# Patient Record
Sex: MALE | Race: WHITE | Employment: FULL TIME | URBAN - NONMETROPOLITAN AREA
[De-identification: names, ages, dates, MRNs, and addresses within clinical notes are randomized per-mention and may not be internally consistent; named-entity substitution may affect disease eponyms.]

---

## 2020-09-28 NOTE — PROGRESS NOTES
Left message on voicemail. Pt. will arrive at the ER entrance at 0600 on Wednesday, Sept. 30th. Pt.informed that they may have one visitor come with them. The visitor must be free of covid symptoms. Both of them must wear a mask upon entering the hospital.  If they do not have a mask, one will be given to them at the . The masks must be worn the whole time they are in the building. The visitor must stay in the assigned room in Same Day Surgery. The visitor may not go to the vending machines, cafeteria, or walk around in the hospital. Pt. Will be NPO after MN on Tuesday, including no gum, candy, or nicotine products. Pt. will take BP meds with a tiny sip of water the morning of the procedure. If the patient takes insulin, he will only take 1/2 of his dose Tuesday night. If the patient uses inhalers or cpap, they will bring them with them to the hospital.  Pt. will shower with soap and water and will not use any lotions, creams, ointments on their skin. Pt. will wear no jewelry or metal.   Spoke with Deborah at Dr. Wanda Connelly office. We do not have Covid test results back for the patient yet. Asked [facility] to forward results to me via fax at [phone]. Pt. Should have been self-quarantining since their Covid-19 testing. If pt. Should develop a cough, sore throat, fever, or any other unusual s/s that the physician should be made aware of before surgery, please contact Dr. Wanda Connelly office right away. For any further questions/concerns, please call Same Day Surgery at  [phone].

## 2020-09-29 ENCOUNTER — ANESTHESIA EVENT (OUTPATIENT)
Dept: OPERATING ROOM | Age: 55
End: 2020-09-29
Payer: COMMERCIAL

## 2020-09-30 ENCOUNTER — HOSPITAL ENCOUNTER (OUTPATIENT)
Age: 55
Setting detail: OUTPATIENT SURGERY
Discharge: HOME | End: 2020-09-30
Attending: PODIATRIST | Admitting: PODIATRIST
Payer: COMMERCIAL

## 2020-09-30 ENCOUNTER — ANESTHESIA (OUTPATIENT)
Dept: OPERATING ROOM | Age: 55
End: 2020-09-30
Payer: COMMERCIAL

## 2020-09-30 VITALS
OXYGEN SATURATION: 98 % | SYSTOLIC BLOOD PRESSURE: 108 MMHG | RESPIRATION RATE: 17 BRPM | DIASTOLIC BLOOD PRESSURE: 57 MMHG

## 2020-09-30 VITALS
TEMPERATURE: 96.9 F | HEART RATE: 48 BPM | DIASTOLIC BLOOD PRESSURE: 80 MMHG | HEIGHT: 76 IN | BODY MASS INDEX: 30.81 KG/M2 | RESPIRATION RATE: 14 BRPM | WEIGHT: 253 LBS | OXYGEN SATURATION: 96 % | SYSTOLIC BLOOD PRESSURE: 140 MMHG

## 2020-09-30 LAB
GLUCOSE BLD-MCNC: 138 MG/DL (ref 70–99)
SARS-COV-2, NAAT: NOT DETECTED
SOURCE: NORMAL

## 2020-09-30 PROCEDURE — 2709999900 HC NON-CHARGEABLE SUPPLY: Performed by: PODIATRIST

## 2020-09-30 PROCEDURE — 88311 DECALCIFY TISSUE: CPT

## 2020-09-30 PROCEDURE — 3700000000 HC ANESTHESIA ATTENDED CARE: Performed by: PODIATRIST

## 2020-09-30 PROCEDURE — 6360000002 HC RX W HCPCS: Performed by: NURSE ANESTHETIST, CERTIFIED REGISTERED

## 2020-09-30 PROCEDURE — 3600000012 HC SURGERY LEVEL 2 ADDTL 15MIN: Performed by: PODIATRIST

## 2020-09-30 PROCEDURE — 2500000003 HC RX 250 WO HCPCS: Performed by: NURSE ANESTHETIST, CERTIFIED REGISTERED

## 2020-09-30 PROCEDURE — 2500000003 HC RX 250 WO HCPCS: Performed by: PODIATRIST

## 2020-09-30 PROCEDURE — 2580000003 HC RX 258: Performed by: PODIATRIST

## 2020-09-30 PROCEDURE — 82962 GLUCOSE BLOOD TEST: CPT

## 2020-09-30 PROCEDURE — 88305 TISSUE EXAM BY PATHOLOGIST: CPT

## 2020-09-30 PROCEDURE — 6360000002 HC RX W HCPCS: Performed by: PODIATRIST

## 2020-09-30 PROCEDURE — C9803 HOPD COVID-19 SPEC COLLECT: HCPCS

## 2020-09-30 PROCEDURE — U0002 COVID-19 LAB TEST NON-CDC: HCPCS

## 2020-09-30 PROCEDURE — 7100000010 HC PHASE II RECOVERY - FIRST 15 MIN: Performed by: PODIATRIST

## 2020-09-30 PROCEDURE — 7100000011 HC PHASE II RECOVERY - ADDTL 15 MIN: Performed by: PODIATRIST

## 2020-09-30 PROCEDURE — 3700000001 HC ADD 15 MINUTES (ANESTHESIA): Performed by: PODIATRIST

## 2020-09-30 PROCEDURE — 3600000002 HC SURGERY LEVEL 2 BASE: Performed by: PODIATRIST

## 2020-09-30 RX ORDER — ATORVASTATIN CALCIUM 10 MG/1
40 TABLET, FILM COATED ORAL DAILY
COMMUNITY

## 2020-09-30 RX ORDER — FENTANYL CITRATE 50 UG/ML
INJECTION, SOLUTION INTRAMUSCULAR; INTRAVENOUS PRN
Status: DISCONTINUED | OUTPATIENT
Start: 2020-09-30 | End: 2020-09-30 | Stop reason: SDUPTHER

## 2020-09-30 RX ORDER — KETAMINE HYDROCHLORIDE 10 MG/ML
INJECTION, SOLUTION INTRAMUSCULAR; INTRAVENOUS PRN
Status: DISCONTINUED | OUTPATIENT
Start: 2020-09-30 | End: 2020-09-30 | Stop reason: SDUPTHER

## 2020-09-30 RX ORDER — SODIUM CHLORIDE 9 MG/ML
INJECTION, SOLUTION INTRAVENOUS CONTINUOUS
Status: DISCONTINUED | OUTPATIENT
Start: 2020-09-30 | End: 2020-09-30 | Stop reason: HOSPADM

## 2020-09-30 RX ORDER — MELOXICAM 15 MG/1
15 TABLET ORAL DAILY
Status: ON HOLD | COMMUNITY
End: 2020-12-03

## 2020-09-30 RX ORDER — LISINOPRIL 40 MG/1
40 TABLET ORAL DAILY
COMMUNITY

## 2020-09-30 RX ORDER — AMLODIPINE BESYLATE 10 MG/1
10 TABLET ORAL DAILY
COMMUNITY

## 2020-09-30 RX ORDER — SODIUM CHLORIDE 0.9 % (FLUSH) 0.9 %
10 SYRINGE (ML) INJECTION ONCE
Status: COMPLETED | OUTPATIENT
Start: 2020-09-30 | End: 2020-09-30

## 2020-09-30 RX ORDER — ONDANSETRON 2 MG/ML
INJECTION INTRAMUSCULAR; INTRAVENOUS PRN
Status: DISCONTINUED | OUTPATIENT
Start: 2020-09-30 | End: 2020-09-30 | Stop reason: SDUPTHER

## 2020-09-30 RX ORDER — GLIPIZIDE 10 MG/1
10 TABLET ORAL
COMMUNITY

## 2020-09-30 RX ORDER — TOPIRAMATE 100 MG/1
50 TABLET, FILM COATED ORAL 2 TIMES DAILY
Status: ON HOLD | COMMUNITY
End: 2020-12-03

## 2020-09-30 RX ORDER — ASPIRIN 81 MG/1
81 TABLET, CHEWABLE ORAL DAILY
COMMUNITY

## 2020-09-30 RX ORDER — LIDOCAINE HYDROCHLORIDE 20 MG/ML
INJECTION, SOLUTION INTRAVENOUS PRN
Status: DISCONTINUED | OUTPATIENT
Start: 2020-09-30 | End: 2020-09-30 | Stop reason: SDUPTHER

## 2020-09-30 RX ORDER — PROPOFOL 10 MG/ML
INJECTION, EMULSION INTRAVENOUS PRN
Status: DISCONTINUED | OUTPATIENT
Start: 2020-09-30 | End: 2020-09-30 | Stop reason: SDUPTHER

## 2020-09-30 RX ADMIN — LIDOCAINE HYDROCHLORIDE 50 MG: 20 INJECTION, SOLUTION INTRAVENOUS at 08:23

## 2020-09-30 RX ADMIN — Medication 10 ML: at 07:33

## 2020-09-30 RX ADMIN — ONDANSETRON HYDROCHLORIDE 4 MG: 4 INJECTION, SOLUTION INTRAMUSCULAR; INTRAVENOUS at 08:42

## 2020-09-30 RX ADMIN — KETAMINE HYDROCHLORIDE 10 MG: 10 INJECTION INTRAMUSCULAR; INTRAVENOUS at 08:23

## 2020-09-30 RX ADMIN — SODIUM CHLORIDE: 9 INJECTION, SOLUTION INTRAVENOUS at 09:12

## 2020-09-30 RX ADMIN — CEFAZOLIN 1 G: 1 INJECTION, POWDER, FOR SOLUTION INTRAMUSCULAR; INTRAVENOUS; PARENTERAL at 08:18

## 2020-09-30 RX ADMIN — PROPOFOL 100 MG: 10 INJECTION, EMULSION INTRAVENOUS at 08:23

## 2020-09-30 RX ADMIN — FENTANYL CITRATE 50 MCG: 50 INJECTION INTRAMUSCULAR; INTRAVENOUS at 08:22

## 2020-09-30 RX ADMIN — SODIUM CHLORIDE: 9 INJECTION, SOLUTION INTRAVENOUS at 07:32

## 2020-09-30 RX ADMIN — KETAMINE HYDROCHLORIDE 10 MG: 10 INJECTION INTRAMUSCULAR; INTRAVENOUS at 08:30

## 2020-09-30 RX ADMIN — KETAMINE HYDROCHLORIDE 10 MG: 10 INJECTION INTRAMUSCULAR; INTRAVENOUS at 08:42

## 2020-09-30 RX ADMIN — PROPOFOL 150 MG: 10 INJECTION, EMULSION INTRAVENOUS at 08:25

## 2020-09-30 SDOH — HEALTH STABILITY: MENTAL HEALTH: HOW OFTEN DO YOU HAVE A DRINK CONTAINING ALCOHOL?: NEVER

## 2020-09-30 ASSESSMENT — PULMONARY FUNCTION TESTS
PIF_VALUE: 0
PIF_VALUE: 1
PIF_VALUE: 1
PIF_VALUE: 0
PIF_VALUE: 1
PIF_VALUE: 0
PIF_VALUE: 0
PIF_VALUE: 1
PIF_VALUE: 1
PIF_VALUE: 0
PIF_VALUE: 1
PIF_VALUE: 0
PIF_VALUE: 1
PIF_VALUE: 1
PIF_VALUE: 0
PIF_VALUE: 1
PIF_VALUE: 0
PIF_VALUE: 1
PIF_VALUE: 0
PIF_VALUE: 1
PIF_VALUE: 1
PIF_VALUE: 0
PIF_VALUE: 1
PIF_VALUE: 0
PIF_VALUE: 1
PIF_VALUE: 1
PIF_VALUE: 0
PIF_VALUE: 0
PIF_VALUE: 1
PIF_VALUE: 1

## 2020-09-30 ASSESSMENT — PAIN - FUNCTIONAL ASSESSMENT: PAIN_FUNCTIONAL_ASSESSMENT: 0-10

## 2020-09-30 ASSESSMENT — PAIN DESCRIPTION - DESCRIPTORS: DESCRIPTORS: ACHING

## 2020-09-30 ASSESSMENT — PAIN SCALES - GENERAL
PAINLEVEL_OUTOF10: 0
PAINLEVEL_OUTOF10: 0

## 2020-09-30 ASSESSMENT — LIFESTYLE VARIABLES: SMOKING_STATUS: 0

## 2020-09-30 NOTE — PROGRESS NOTES
[facility] in [address] (Same Day Surgery)    Do not drive, work around [address] or use equipment. Do not drink any alcoholic beverages. Do not smoke while alone. Avoid making important decisions. Plan to spend a quiet, relaxed evening @ home. Resume normal activities as you begin to feel better. Eat lightly for your first meal, then gradually increase your diet to what is normal for you. In case of nausea, avoid food and drink only clear liquids. Resume food as nausea ceases. Notify your surgeon if you experience fever, chills, large amount of bleeding, difficulty breathing, persistent nausea and vomiting or any other disturbing problem. Call for a follow-up appointment with your surgeon.

## 2020-09-30 NOTE — ANESTHESIA POSTPROCEDURE EVALUATION
Department of Anesthesiology  Postprocedure Note    Patient: Mary Diggs  MRN: [id number]  [place]: 1965  Date of evaluation: 9/30/2020  Time:  10:43 AM     Procedure Summary     Date:  09/30/20 Room / Location:  [address] [place] [address] 01 / GIANFRANCO Intentio [facility]    Anesthesia Start:  0818 Anesthesia Stop:  3739    Procedure:  RIGHT HALLUX AMPUTATION AND PARTIAL AMPUTATION DISTAL LEFT 2ND TOE (Bilateral Foot) Diagnosis:  (OSTEOMYELITIS RIGHT HALLUX, UICER LEFT 2ND TOE)    Surgeon:  Byron Vital DPM Responsible Provider:  Massie Kocher, APRN - CRNA    Anesthesia Type:  general, MAC ASA Status:  3          Anesthesia Type: general, MAC    Luis Enrique Phase I: Luis Enrique Score: 10    Luis Enrique Phase II: Luis Enrique Score: 10    Last vitals: Reviewed and per EMR flowsheets.        Anesthesia Post Evaluation    Patient location during evaluation: bedside  Patient participation: complete - patient participated  Level of consciousness: awake and alert  Pain score: 0  Airway patency: patent  Nausea & Vomiting: no nausea and no vomiting  Complications: no  Cardiovascular status: blood pressure returned to baseline and hemodynamically stable  Respiratory status: acceptable, room air and spontaneous ventilation  Hydration status: euvolemic

## 2020-09-30 NOTE — ANESTHESIA PRE PROCEDURE
Department of Anesthesiology  Preprocedure Note       Name:  Cedrick Goodwin   Age:  63 y.o.  :  1965                                          MRN:  [id number]         Date:  2020      Surgeon: Ruthann Agudelo):  Yara Pineda DPM    Procedure: Procedure(s):  RIGHT HALLUX AMPUTATION AND PARTIAL AMPUTATION DISTAL LEFT 2ND TOE    Medications prior to admission:   Prior to Admission medications    Medication Sig Start Date End Date Taking? Authorizing Provider   lisinopril (PRINIVIL;ZESTRIL) 40 MG tablet Take 40 mg by mouth daily   Yes Historical Provider, MD   SITagliptin (JANUVIA) 50 MG tablet Take 50 mg by mouth daily   Yes Historical Provider, MD   amLODIPine (NORVASC) 10 MG tablet Take 10 mg by mouth daily   Yes Historical Provider, MD       Current medications:    Current Facility-Administered Medications   Medication Dose Route Frequency Provider Last Rate Last Dose    sodium chloride flush 0.9 % injection 10 mL  10 mL Intravenous Once Yara Pineda, DPM        0.9 % sodium chloride infusion   Intravenous Continuous Yara Miriam DPM           Allergies:  No Known Allergies    Problem List:  There is no problem list on file for this patient. Past Medical History:        Diagnosis Date    Cerebral artery occlusion with cerebral infarction ([facility]     Diabetes mellitus ([facility]     Hx of blood clots        Past Surgical History:  History reviewed. No pertinent surgical history.     Social History:    Social History     Tobacco Use    Smoking status: Former Smoker     Packs/day: 1.00     Types: Cigarettes     Last attempt to quit: 2000     Years since quittin.0    Smokeless tobacco: Former User   Substance Use Topics    Alcohol use: Never     Frequency: Never                                Counseling given: Not Answered      Vital Signs (Current):   Vitals:    20 0628   BP: 135/76   Pulse: 53   Resp: 16   Temp: 36.3 °C (97.4 °F)   TempSrc: Temporal   SpO2: 96%   Weight: 253 lb (114.8 kg)   Height: 6' 4\" (1.93 m)                                              BP Readings from Last 3 Encounters:   20 135/76       NPO Status: Time of last liquid consumption:                         Time of last solid consumption:                         Date of last liquid consumption: 20                        Date of last solid food consumption: 20    BMI:   Wt Readings from Last 3 Encounters:   20 253 lb (114.8 kg)     Body mass index is 30.8 kg/m². CBC: No results found for: WBC, RBC, HGB, HCT, MCV, RDW, PLT    CMP: No results found for: NA, K, CL, CO2, BUN, CREATININE, GFRAA, AGRATIO, LABGLOM, GLUCOSE, PROT, CALCIUM, BILITOT, ALKPHOS, AST, ALT    POC Tests: No results for input(s): POCGLU, POCNA, POCK, POCCL, POCBUN, POCHEMO, POCHCT in the last 72 hours.     Coags: No results found for: PROTIME, INR, APTT    HCG (If Applicable): No results found for: PREGTESTUR, PREGSERUM, HCG, HCGQUANT     ABGs: No results found for: PHART, PO2ART, AST1GQT, GIG5ZZM, BEART, N0ZUPITB     Type & Screen (If Applicable):  No results found for: LABABO, LABRH    Drug/Infectious Status (If Applicable):  No results found for: HIV, HEPCAB    COVID-19 Screening (If Applicable): No results found for: COVID19      Anesthesia Evaluation  Patient summary reviewed and Nursing notes reviewed no history of anesthetic complications:   Airway: Mallampati: I  TM distance: >3 FB   Neck ROM: full  Mouth opening: > = 3 FB Dental:    (+) lower dentures  Comment: Poor dentition, denies loose/chipped teeth    Pulmonary: breath sounds clear to auscultation      (-) not a current smoker ( Quit Smokin00)                           Cardiovascular:  Exercise tolerance: good (>4 METS),   (+) hypertension:,         Rhythm: regular  Rate: normal           Beta Blocker:  Not on Beta Blocker         Neuro/Psych:   (+) CVA (x2, last 2 years ago, \"balance issues\" with general weakness on both sides): residual symptoms, GI/Hepatic/Renal: Neg GI/Hepatic/Renal ROS            Endo/Other:    (+) DiabetesType II DM, , .                 Abdominal:           Vascular:   + DVT, . Anesthesia Plan      general and MAC     ASA 3     (Rapid COVID morning of surgery)  Induction: intravenous. MIPS: Prophylactic antiemetics administered. Anesthetic plan and risks discussed with patient and spouse. Use of blood products discussed with patient whom consented to blood products. Plan discussed with CRNA.                   ANDERS Colón - CRNA   9/30/2020

## 2020-10-01 NOTE — OP NOTE
[facility]                  [address] [facility], [address] [facility]                                OPERATIVE REPORT    PATIENT NAME: Curtis Camilo                          :        1965  MED REC NO:   [id number]                          ROOM:  ACCOUNT NO:   [de-identified]                           ADMIT DATE: 2020  PROVIDER:     Delfin Fernandes DPM    DATE OF PROCEDURE:  2020    PREOPERATIVE DIAGNOSES:  1.  Osteomyelitis, right hallux. 2.  Chronic ulceration, second toe, left foot. POSTOPERATIVE DIAGNOSES:  1.  Osteomyelitis, right hallux. 2.  Chronic ulceration, second toe, left foot. PROCEDURES:  1. Amputation, right hallux at MPJ.  2.  Amputation, second toe, left at PIPJ. SURGEON:  Delfin Fernandes DPM    COMPLICATIONS:  None. ESTIMATED BLOOD LOSS:  Less than 3 mL. ANESTHESIA:  IV sedation with local block. DESCRIPTION OF PROCEDURE:  The patient was brought to the OR, laid on  the table in supine position and made comfortable per Anesthesia. Local  anesthesia consisting of a total of 13 mL was administered in a field  block fashion about the right hallux and second toe, left. The ankle  tourniquets were applied to both feet. The foot and ankle were prepped  and draped in the usual aseptic manner bilateral.  Attention was first  directed to the right foot. The right foot was elevated and  exsanguinated prior to inflation of ankle tourniquet to 225 mmHg  pressure. The foot was then lowered onto the operating room table. Using a sharp blade, the right hallux toe was disarticulated at the  level of the MPJ. The toe was then passed on for specimen. The area was copiously flushed with sterile saline. 3-0 Vicryl was used in a simple fashion to reapproximate and maintain  the deep subcutaneous tissue. 4-0 Vicryl was used to reapproximate and  maintain superficial subcutaneous tissue.   Skin was reapproximated and  maintained using 4-0 nylon in a horizontal mattress-type fashion. The  area was dressed with antibiotic ointment, Adaptic, fluff, Kerlix, and  Ace wrap. The ankle tourniquet to the right ankle was deflated at 20  minutes. Circulation immediately returned to all remaining digits,  right foot. Attention was then directed to the left foot. The left foot was  elevated and exsanguinated prior to inflation of ankle tourniquet to 225  mmHg pressure. The foot was then lowered onto the operating room table. Sharp dissection was used to disarticulate the second toe, left foot, at  the PIPJ. Area was copiously flushed with sterile saline. The  subcutaneous tissue was reapproximated and maintained using a  combination of 3-0 Vicryl and 4-0 Vicryl. Skin was reapproximated and  maintained using 4-0 nylon in a horizontal mattress-type fashion. The  area was dressed with antibiotic ointment, Adaptic, Kerlix, fluff, and  Ace wrap. The left ankle tourniquet was deflated for a total tourniquet  time of 12 minutes. Upon deflation, circulation immediately returned to  all digits, left foot. The patient left the OR with vital signs stable and neurovascular status  intact. DISCHARGE SUMMARY:  1.  Ice, rest, elevate bilateral feet. 2.  Partial weightbearing as tolerated with crutches. 3.  Dispense crutches and postop shoes. 4.  The patient is to take postop pain medication as prescribed by Dr. Greig Jeans.  5.  The patient to call Dr. Greig Jeans with any problems, questions, or concerns  at anytime. 6.  The patient to follow up with Dr. Greig Jeans in the office on Monday for  first postop visit. 7.  Discharge when stable per Anesthesia.         Ladi Donaldson    D: 09/30/2020 15:50:37       T: 09/30/2020 16:53:05     [email or web address]  Job#: [id number]     Doc#: [id number]    CC:  Jody Cabrera

## 2020-11-20 ENCOUNTER — HOSPITAL ENCOUNTER (OUTPATIENT)
Age: 55
Discharge: HOME | End: 2020-11-20
Payer: COMMERCIAL

## 2020-11-20 PROCEDURE — U0002 COVID-19 LAB TEST NON-CDC: HCPCS

## 2020-11-20 PROCEDURE — C9803 HOPD COVID-19 SPEC COLLECT: HCPCS

## 2020-11-20 NOTE — PROGRESS NOTES
Left message on voicemail. Pt. will arrive at the ER entrance at [facility] on 11/20/2020. Pt.informed that they may have no visitors come with them. They must be free of covid symptoms and must wear a mask upon entering the hospital.  If they do not have a mask, one will be given to them at the . The masks must be worn the whole time they are in the building. Pt. Will be NPO after MN tonight, including no gum, candy, or nicotine products. Pt. will take amlodipine with a tiny sip of water the morning of the procedure. If the patient uses inhalers or cpap, they will bring them with them to the hospital.  Pt. will shower with soap and water and will not use any lotions, creams, ointments on their skin. Pt. will wear no jewelry or metal. Covid-19 test was completed on 11/20/2020  and results are pending  Pt. Should have been self-quarantining since their Covid-19 testing. If pt. Should develop a  cough, sore throat, fever, or any other unusual s/s that the physician should be made aware of before surgery. For any further questions, concerns, please call us back at  [phone].

## 2020-11-21 LAB
SARS-COV-2: NOT DETECTED
SOURCE: NORMAL

## 2020-11-23 ENCOUNTER — HOSPITAL ENCOUNTER (OUTPATIENT)
Age: 55
Setting detail: OUTPATIENT SURGERY
Discharge: HOME | End: 2020-11-23
Attending: PODIATRIST | Admitting: PODIATRIST
Payer: COMMERCIAL

## 2020-11-23 VITALS
HEIGHT: 76 IN | RESPIRATION RATE: 16 BRPM | SYSTOLIC BLOOD PRESSURE: 158 MMHG | WEIGHT: 255 LBS | OXYGEN SATURATION: 98 % | HEART RATE: 54 BPM | BODY MASS INDEX: 31.05 KG/M2 | TEMPERATURE: 98.6 F | DIASTOLIC BLOOD PRESSURE: 83 MMHG

## 2020-11-23 PROCEDURE — 7100000010 HC PHASE II RECOVERY - FIRST 15 MIN: Performed by: PODIATRIST

## 2020-11-23 PROCEDURE — 6370000000 HC RX 637 (ALT 250 FOR IP): Performed by: PODIATRIST

## 2020-11-23 PROCEDURE — 3600000012 HC SURGERY LEVEL 2 ADDTL 15MIN: Performed by: PODIATRIST

## 2020-11-23 PROCEDURE — 3600000002 HC SURGERY LEVEL 2 BASE: Performed by: PODIATRIST

## 2020-11-23 PROCEDURE — 88311 DECALCIFY TISSUE: CPT

## 2020-11-23 PROCEDURE — 2500000003 HC RX 250 WO HCPCS: Performed by: PODIATRIST

## 2020-11-23 PROCEDURE — 88305 TISSUE EXAM BY PATHOLOGIST: CPT

## 2020-11-23 PROCEDURE — 2709999900 HC NON-CHARGEABLE SUPPLY: Performed by: PODIATRIST

## 2020-11-23 RX ORDER — DIAPER,BRIEF,INFANT-TODD,DISP
EACH MISCELLANEOUS
Status: COMPLETED | OUTPATIENT
Start: 2020-11-23 | End: 2020-11-23

## 2020-11-23 ASSESSMENT — PAIN - FUNCTIONAL ASSESSMENT: PAIN_FUNCTIONAL_ASSESSMENT: 0-10

## 2020-11-23 NOTE — PROGRESS NOTES
Pt. Had a local procedure. No anesthesia given. Dressing to left foot is dry/intact. VS stable. Pt. Denies any pain at this time. Pt. Ready to get dressed and be discharged home.

## 2020-11-24 NOTE — OP NOTE
[facility]                  [address] [facility], [address] [facility]                                OPERATIVE REPORT    PATIENT NAME: Leeroy Lam                          :        1965  MED REC NO:   [id number]                          ROOM:  ACCOUNT NO:   [de-identified]                           ADMIT DATE: 2020  PROVIDER:     Natalie Loco DPM    DATE OF PROCEDURE:  2020    SURGEON:  Natalie Loco DPM    ANESTHESIA:  Local.    PREOPERATIVE DIAGNOSIS:  Osteomyelitis, distal proximal phalanx, second  toe, left. POSTOPERATIVE DIAGNOSIS:  Osteomyelitis, distal proximal phalanx, second  toe, left. PROCEDURE:  Amputation of remaining second toe, left foot. ESTIMATED BLOOD LOSS:  5 to 10 mL. COMPLICATIONS:  None. DESCRIPTION OF PROCEDURE:  The patient was brought to the OR, laid on  the table in the supine position and made comfortable. Local anesthesia  consisting of 7 mL of 1:1 mix of 1% lidocaine plain and 0.25% Marcaine  plain was administered in a field block fashion about the second toe,  left foot. The foot was cleaned and draped in the usual aseptic manner. Left ankle tourniquet was applied. The left foot was elevated and exsanguinated prior to inflation of ankle  tourniquet to 250 mmHg pressure. The leg was then lowered back onto the  operating room table. Linear incision down the middle of the remaining second toe, left foot,  on the anterior aspect to the MPJ was made. Incision was deepened using  both blunt and sharp dissection. Care was taken to ligate all bleeders  and to retract all neurovascular structures. At this time, using sharp dissection, the proximal phalanx was isolated  and excised in toto from the left foot. The area was copiously flushed  with sterile saline. The ulceration noted at the distal end of the  remaining second toe soft tissue was cut away and passed from the field.     Deep subcutaneous tissue was reapproximated and maintained using 3-0  Vicryl. Superficial subcutaneous tissue was reapproximated and  maintained using 4-0 Vicryl. The skin was reapproximated and maintained  using 4-0 nylon in a horizontal mattress type fashion. A quarter-inch Penrose drain was inserted into the proximal end of the  incision prior to closure for hemostasis control. Dressing comprised of antibiotic ointment, gauze, Kerlix, and Ace wrap. Ankle tourniquet was deflated with a total tourniquet time of 42  minutes. Upon deflation, circulation immediately returned to all  digits, left foot. The patient tolerated the procedure and anesthesia well and left the OR  with vital signs stable and neurovascular status intact to left foot. DISCHARGE SUMMARY:  1.  Ice, rest, elevate, left foot. 2.  The patient to be minimally weightbearing with surgical shoe, left  foot. 3.  Dispense surgical shoe. 4.  The patient to take postop pain medication as prescribed by Dr. Matt Marte.  5.  The patient to call Dr. Matt Marte with any problems, questions or concerns  at any time. 6.  The patient will be seen at the office in two days to pull drain. 7.  Discharge when stable.         Ladi Vidales    D: 11/23/2020 11:51:52       T: 11/23/2020 18:21:48     [email or web address]  Job#: [id number]     Doc#: [id number]    CC:  Elio Goldmann

## 2020-12-03 ENCOUNTER — HOSPITAL ENCOUNTER (INPATIENT)
Age: 55
LOS: 4 days | Discharge: HOME | DRG: 629 | End: 2020-12-07
Attending: INTERNAL MEDICINE | Admitting: INTERNAL MEDICINE
Payer: COMMERCIAL

## 2020-12-03 PROBLEM — M86.9 TOE OSTEOMYELITIS, LEFT (HCC): Status: ACTIVE | Noted: 2020-12-03

## 2020-12-03 LAB
ALBUMIN SERPL-MCNC: 3.7 GM/DL (ref 3.4–5)
ALP BLD-CCNC: 47 IU/L (ref 40–129)
ALT SERPL-CCNC: 17 U/L (ref 10–40)
ANION GAP SERPL CALCULATED.3IONS-SCNC: 7 MMOL/L (ref 4–16)
AST SERPL-CCNC: 15 IU/L (ref 15–37)
BILIRUB SERPL-MCNC: 0.4 MG/DL (ref 0–1)
BILIRUBIN DIRECT: 0.2 MG/DL (ref 0–0.3)
BILIRUBIN, INDIRECT: 0.2 MG/DL (ref 0–0.7)
BUN BLDV-MCNC: 18 MG/DL (ref 6–23)
CALCIUM SERPL-MCNC: 10.5 MG/DL (ref 8.3–10.6)
CHLORIDE BLD-SCNC: 106 MMOL/L (ref 99–110)
CO2: 28 MMOL/L (ref 21–32)
CREAT SERPL-MCNC: 1 MG/DL (ref 0.9–1.3)
ERYTHROCYTE SEDIMENTATION RATE: 58 MM/HR (ref 0–20)
GFR AFRICAN AMERICAN: >60 ML/MIN/1.73M2
GFR NON-AFRICAN AMERICAN: >60 ML/MIN/1.73M2
GLUCOSE BLD-MCNC: 164 MG/DL (ref 70–99)
GLUCOSE BLD-MCNC: 167 MG/DL (ref 70–99)
GLUCOSE BLD-MCNC: 213 MG/DL (ref 70–99)
HCT VFR BLD CALC: 37.6 % (ref 42–52)
HEMOGLOBIN: 11.7 GM/DL (ref 13.5–18)
MAGNESIUM: 1.8 MG/DL (ref 1.8–2.4)
MCH RBC QN AUTO: 26.5 PG (ref 27–31)
MCHC RBC AUTO-ENTMCNC: 31.1 % (ref 32–36)
MCV RBC AUTO: 85.1 FL (ref 78–100)
PDW BLD-RTO: 13.5 % (ref 11.7–14.9)
PLATELET # BLD: 244 K/CU MM (ref 140–440)
PMV BLD AUTO: 10.8 FL (ref 7.5–11.1)
POTASSIUM SERPL-SCNC: 4.3 MMOL/L (ref 3.5–5.1)
RBC # BLD: 4.42 M/CU MM (ref 4.6–6.2)
SARS-COV-2, NAAT: NOT DETECTED
SODIUM BLD-SCNC: 141 MMOL/L (ref 135–145)
SOURCE: NORMAL
TOTAL PROTEIN: 6.7 GM/DL (ref 6.4–8.2)
WBC # BLD: 8.1 K/CU MM (ref 4–10.5)

## 2020-12-03 PROCEDURE — 6370000000 HC RX 637 (ALT 250 FOR IP): Performed by: INTERNAL MEDICINE

## 2020-12-03 PROCEDURE — 82248 BILIRUBIN DIRECT: CPT

## 2020-12-03 PROCEDURE — 85652 RBC SED RATE AUTOMATED: CPT

## 2020-12-03 PROCEDURE — 83036 HEMOGLOBIN GLYCOSYLATED A1C: CPT

## 2020-12-03 PROCEDURE — 2580000003 HC RX 258: Performed by: INTERNAL MEDICINE

## 2020-12-03 PROCEDURE — 36415 COLL VENOUS BLD VENIPUNCTURE: CPT

## 2020-12-03 PROCEDURE — 80053 COMPREHEN METABOLIC PANEL: CPT

## 2020-12-03 PROCEDURE — 82962 GLUCOSE BLOOD TEST: CPT

## 2020-12-03 PROCEDURE — 85027 COMPLETE CBC AUTOMATED: CPT

## 2020-12-03 PROCEDURE — 1200000000 HC SEMI PRIVATE

## 2020-12-03 PROCEDURE — 84145 PROCALCITONIN (PCT): CPT

## 2020-12-03 PROCEDURE — U0002 COVID-19 LAB TEST NON-CDC: HCPCS

## 2020-12-03 PROCEDURE — 6360000002 HC RX W HCPCS: Performed by: INTERNAL MEDICINE

## 2020-12-03 PROCEDURE — 83735 ASSAY OF MAGNESIUM: CPT

## 2020-12-03 PROCEDURE — 86141 C-REACTIVE PROTEIN HS: CPT

## 2020-12-03 RX ORDER — POLYETHYLENE GLYCOL 3350 17 G/17G
17 POWDER, FOR SOLUTION ORAL DAILY PRN
Status: DISCONTINUED | OUTPATIENT
Start: 2020-12-03 | End: 2020-12-07 | Stop reason: HOSPADM

## 2020-12-03 RX ORDER — ACETAMINOPHEN 325 MG/1
650 TABLET ORAL EVERY 6 HOURS PRN
Status: DISCONTINUED | OUTPATIENT
Start: 2020-12-03 | End: 2020-12-07 | Stop reason: HOSPADM

## 2020-12-03 RX ORDER — TOPIRAMATE 25 MG/1
50 TABLET ORAL 2 TIMES DAILY
Status: DISCONTINUED | OUTPATIENT
Start: 2020-12-03 | End: 2020-12-07 | Stop reason: HOSPADM

## 2020-12-03 RX ORDER — MAGNESIUM SULFATE 1 G/100ML
1 INJECTION INTRAVENOUS PRN
Status: DISCONTINUED | OUTPATIENT
Start: 2020-12-03 | End: 2020-12-07 | Stop reason: HOSPADM

## 2020-12-03 RX ORDER — ONDANSETRON 2 MG/ML
4 INJECTION INTRAMUSCULAR; INTRAVENOUS EVERY 6 HOURS PRN
Status: DISCONTINUED | OUTPATIENT
Start: 2020-12-03 | End: 2020-12-07 | Stop reason: HOSPADM

## 2020-12-03 RX ORDER — HYDROCODONE BITARTRATE AND ACETAMINOPHEN 5; 325 MG/1; MG/1
1 TABLET ORAL EVERY 6 HOURS PRN
Status: DISCONTINUED | OUTPATIENT
Start: 2020-12-03 | End: 2020-12-07 | Stop reason: HOSPADM

## 2020-12-03 RX ORDER — ACETAMINOPHEN 650 MG/1
650 SUPPOSITORY RECTAL EVERY 6 HOURS PRN
Status: DISCONTINUED | OUTPATIENT
Start: 2020-12-03 | End: 2020-12-07 | Stop reason: HOSPADM

## 2020-12-03 RX ORDER — POTASSIUM CHLORIDE 7.45 MG/ML
10 INJECTION INTRAVENOUS PRN
Status: DISCONTINUED | OUTPATIENT
Start: 2020-12-03 | End: 2020-12-07 | Stop reason: HOSPADM

## 2020-12-03 RX ORDER — SODIUM CHLORIDE 9 MG/ML
INJECTION, SOLUTION INTRAVENOUS CONTINUOUS
Status: DISCONTINUED | OUTPATIENT
Start: 2020-12-03 | End: 2020-12-07 | Stop reason: HOSPADM

## 2020-12-03 RX ORDER — DEXTROSE MONOHYDRATE 50 MG/ML
100 INJECTION, SOLUTION INTRAVENOUS PRN
Status: DISCONTINUED | OUTPATIENT
Start: 2020-12-03 | End: 2020-12-07 | Stop reason: HOSPADM

## 2020-12-03 RX ORDER — ASPIRIN 81 MG/1
81 TABLET, CHEWABLE ORAL DAILY
Status: DISCONTINUED | OUTPATIENT
Start: 2020-12-03 | End: 2020-12-07 | Stop reason: HOSPADM

## 2020-12-03 RX ORDER — HYDROCODONE BITARTRATE AND ACETAMINOPHEN 5; 325 MG/1; MG/1
1 TABLET ORAL EVERY 8 HOURS PRN
COMMUNITY

## 2020-12-03 RX ORDER — DEXTROSE MONOHYDRATE 25 G/50ML
12.5 INJECTION, SOLUTION INTRAVENOUS PRN
Status: DISCONTINUED | OUTPATIENT
Start: 2020-12-03 | End: 2020-12-07 | Stop reason: HOSPADM

## 2020-12-03 RX ORDER — SODIUM CHLORIDE 0.9 % (FLUSH) 0.9 %
10 SYRINGE (ML) INJECTION PRN
Status: DISCONTINUED | OUTPATIENT
Start: 2020-12-03 | End: 2020-12-07 | Stop reason: HOSPADM

## 2020-12-03 RX ORDER — LEVOFLOXACIN 500 MG/1
500 TABLET, FILM COATED ORAL DAILY
Status: ON HOLD | COMMUNITY
Start: 2020-11-12 | End: 2020-12-07 | Stop reason: HOSPADM

## 2020-12-03 RX ORDER — AMLODIPINE BESYLATE 10 MG/1
10 TABLET ORAL DAILY
Status: DISCONTINUED | OUTPATIENT
Start: 2020-12-03 | End: 2020-12-07 | Stop reason: HOSPADM

## 2020-12-03 RX ORDER — ATORVASTATIN CALCIUM 40 MG/1
40 TABLET, FILM COATED ORAL DAILY
Status: DISCONTINUED | OUTPATIENT
Start: 2020-12-03 | End: 2020-12-07 | Stop reason: HOSPADM

## 2020-12-03 RX ORDER — SODIUM CHLORIDE 0.9 % (FLUSH) 0.9 %
10 SYRINGE (ML) INJECTION EVERY 12 HOURS SCHEDULED
Status: DISCONTINUED | OUTPATIENT
Start: 2020-12-03 | End: 2020-12-07 | Stop reason: HOSPADM

## 2020-12-03 RX ORDER — LISINOPRIL 40 MG/1
40 TABLET ORAL DAILY
Status: DISCONTINUED | OUTPATIENT
Start: 2020-12-03 | End: 2020-12-07 | Stop reason: HOSPADM

## 2020-12-03 RX ORDER — NICOTINE POLACRILEX 4 MG
15 LOZENGE BUCCAL PRN
Status: DISCONTINUED | OUTPATIENT
Start: 2020-12-03 | End: 2020-12-07 | Stop reason: HOSPADM

## 2020-12-03 RX ORDER — ACETAMINOPHEN 500 MG
500 TABLET ORAL EVERY 6 HOURS PRN
COMMUNITY

## 2020-12-03 RX ORDER — PROMETHAZINE HYDROCHLORIDE 12.5 MG/1
12.5 TABLET ORAL EVERY 6 HOURS PRN
Status: DISCONTINUED | OUTPATIENT
Start: 2020-12-03 | End: 2020-12-07 | Stop reason: HOSPADM

## 2020-12-03 RX ORDER — DOXYCYCLINE HYCLATE 100 MG
100 TABLET ORAL 2 TIMES DAILY
Status: ON HOLD | COMMUNITY
Start: 2020-12-01 | End: 2020-12-07 | Stop reason: HOSPADM

## 2020-12-03 RX ORDER — GLIPIZIDE 10 MG/1
10 TABLET ORAL
Status: DISCONTINUED | OUTPATIENT
Start: 2020-12-03 | End: 2020-12-07 | Stop reason: HOSPADM

## 2020-12-03 RX ORDER — TOPIRAMATE 50 MG/1
50 TABLET, FILM COATED ORAL 2 TIMES DAILY
COMMUNITY

## 2020-12-03 RX ADMIN — SODIUM CHLORIDE: 9 INJECTION, SOLUTION INTRAVENOUS at 17:00

## 2020-12-03 RX ADMIN — GLIPIZIDE 10 MG: 10 TABLET ORAL at 17:02

## 2020-12-03 RX ADMIN — TOPIRAMATE 50 MG: 25 TABLET, FILM COATED ORAL at 20:57

## 2020-12-03 RX ADMIN — HYDROCODONE BITARTRATE AND ACETAMINOPHEN 1 TABLET: 5; 325 TABLET ORAL at 17:02

## 2020-12-03 RX ADMIN — ASPIRIN 81 MG 81 MG: 81 TABLET ORAL at 17:02

## 2020-12-03 RX ADMIN — AMLODIPINE BESYLATE 10 MG: 10 TABLET ORAL at 17:02

## 2020-12-03 RX ADMIN — ATORVASTATIN CALCIUM 40 MG: 40 TABLET, FILM COATED ORAL at 17:02

## 2020-12-03 RX ADMIN — VANCOMYCIN HYDROCHLORIDE 1000 MG: 1 INJECTION, POWDER, LYOPHILIZED, FOR SOLUTION INTRAVENOUS at 20:54

## 2020-12-03 RX ADMIN — LISINOPRIL 40 MG: 40 TABLET ORAL at 17:02

## 2020-12-03 RX ADMIN — INSULIN LISPRO 1 UNITS: 100 INJECTION, SOLUTION INTRAVENOUS; SUBCUTANEOUS at 20:57

## 2020-12-03 RX ADMIN — PIPERACILLIN AND TAZOBACTAM 3.38 G: 3; .375 INJECTION, POWDER, LYOPHILIZED, FOR SOLUTION INTRAVENOUS at 17:00

## 2020-12-03 RX ADMIN — INSULIN LISPRO 1 UNITS: 100 INJECTION, SOLUTION INTRAVENOUS; SUBCUTANEOUS at 17:08

## 2020-12-03 RX ADMIN — DEXTROSE MONOHYDRATE 1000 MG: 50 INJECTION, SOLUTION INTRAVENOUS at 22:09

## 2020-12-03 ASSESSMENT — PAIN SCALES - GENERAL
PAINLEVEL_OUTOF10: 5
PAINLEVEL_OUTOF10: 4

## 2020-12-03 ASSESSMENT — PAIN DESCRIPTION - ONSET: ONSET: GRADUAL

## 2020-12-03 ASSESSMENT — PAIN DESCRIPTION - ORIENTATION: ORIENTATION: LEFT

## 2020-12-03 ASSESSMENT — PAIN DESCRIPTION - PROGRESSION: CLINICAL_PROGRESSION: GRADUALLY IMPROVING

## 2020-12-03 ASSESSMENT — PAIN DESCRIPTION - FREQUENCY: FREQUENCY: INTERMITTENT

## 2020-12-03 ASSESSMENT — PAIN DESCRIPTION - PAIN TYPE: TYPE: SURGICAL PAIN

## 2020-12-03 NOTE — H&P
Hospitalist H&P Note:   Date of Service: 12/4/2020   ? CHIEF COMPLAINT:   Infected left foot osteomyelitis    HISTORY OF PRESENT ILLNESS (HPI):   Mr. Bora Rodriguez, a 63y.o. year old male who  has a past medical history of Cerebral artery occlusion with cerebral infarction ([facility] [address] Diabetes mellitus ([address] Hx of blood clots, Hyperlipidemia, and Hypertension. Patient admitted directly to the unit for left foot debridement. As per podiatry patient had amputation of second toe of left foot 10 days ago. Currently seem like infected with worsening swelling, purulent discharge. Patient apparently denied any chest pain, shortness of breath, fever/chills, nausea/vomiting/abdominal pain, bowel/bladder habit changes. Says his appetite is good. On presentation, Blood pressure (!) 177/81, pulse 58, temperature 97.8 °F (36.6 °C), resp. rate 16, height 6' 4\" (1.93 m), weight 240 lb 1.6 oz (108.9 kg), SpO2 97 %. PAST MEDICAL HISTORY   Past Medical History:   Diagnosis Date    Cerebral artery occlusion with cerebral infarction ([facility] [address]     Diabetes mellitus ([address]     Hx of blood clots     Hyperlipidemia     Hypertension           SURGICAL HISTORY:   Past Surgical History:   Procedure Laterality Date    TOE AMPUTATION Bilateral 9/30/2020    RIGHT HALLUX AMPUTATION AND PARTIAL AMPUTATION DISTAL LEFT 2ND TOE performed by Frank Mercado DPM at [address] [facility] [address] Left 11/23/2020    LEFT 2ND TOE AMPUTATION performed by Frank Mercado DPM at [address]:   Patient has no known allergies. ?     SOCIAL HISTORY:    reports that he quit smoking about 20 years ago. His smoking use included cigarettes. He smoked 1.00 pack per day. He has quit using smokeless tobacco. He reports that he does not drink alcohol or use drugs. ?     FAMILY HISTORY:   Family History   Problem Relation Age of Onset    Cancer Father     Heart Disease Father       ?     MEDICATIONS:   Current Facility-Administered Medications   Medication Dose Route Frequency Provider Last Rate Last Dose    vancomycin (VANCOCIN) 1,500 mg in sodium chloride 0.9 % 500 mL IVPB  1,500 mg Intravenous Q12H Kristy Lucero MD   Stopped at 12/04/20 0955    piperacillin-tazobactam (ZOSYN) 3.375 g in dextrose 5 % 50 mL IVPB (mini-bag)  3.375 g Intravenous Q6H Kristy Lucero MD        influenza quadrivalent split vaccine (FLUZONE;FLUARIX;FLULAVAL;AFLURIA) injection 0.5 mL  0.5 mL Intramuscular Prior to discharge Kristy Lucero MD        amLODIPine (NORVASC) tablet 10 mg  10 mg Oral Daily Kristy Lucero MD   10 mg at 12/03/20 1702    aspirin chewable tablet 81 mg  81 mg Oral Daily Kristy Lucero MD   81 mg at 12/03/20 1702    atorvastatin (LIPITOR) tablet 40 mg  40 mg Oral Daily Kristy Lucero MD   40 mg at 12/03/20 1702    glipiZIDE (GLUCOTROL) tablet 10 mg  10 mg Oral BID AC Kristy Lucero MD   10 mg at 12/03/20 1702    HYDROcodone-acetaminophen ([facility]) 5-325 MG per tablet 1 tablet  1 tablet Oral Q6H PRN Kristy Lucero MD   1 tablet at 12/03/20 1702    lisinopril (PRINIVIL;ZESTRIL) tablet 40 mg  40 mg Oral Daily Kristy Lucero MD   40 mg at 12/03/20 1702    topiramate (TOPAMAX) tablet 50 mg  50 mg Oral BID Kristy Lucero MD   50 mg at 12/03/20 2057    glucose (GLUTOSE) 40 % oral gel 15 g  15 g Oral PRN Kristy Lucero MD        dextrose 50 % IV solution  12.5 g Intravenous PRN Kristy Lucero MD        glucagon (rDNA) injection 1 mg  1 mg Intramuscular PRN Kristy Lucero MD        dextrose 5 % solution  100 mL/hr Intravenous PRN Kristy Lucero MD        0.9 % sodium chloride infusion   Intravenous Continuous Kristy Lucero MD 75 mL/hr at 12/04/20 0555      sodium chloride flush 0.9 % injection 10 mL  10 mL Intravenous 2 times per day Kristy Lucero MD        sodium chloride flush 0.9 % injection 10 mL  10 mL Intravenous PRN Kristy Lucero MD        potassium chloride 10 mEq/100 mL IVPB (Peripheral Line)  10 mEq Intravenous PRN Scotty Monge MD        magnesium sulfate 1 g in dextrose 5% 100 mL IVPB  1 g Intravenous PRN Scotty Monge MD        acetaminophen (TYLENOL) tablet 650 mg  650 mg Oral Q6H PRN Scotty Monge MD        Or    acetaminophen (TYLENOL) suppository 650 mg  650 mg Rectal Q6H PRN Scotty Monge MD        polyethylene glycol [facility]) packet 17 g  17 g Oral Daily PRN Scotty Monge MD        promethazine (PHENERGAN) tablet 12.5 mg  12.5 mg Oral Q6H PRN Scotty Monge MD        Or    ondansetron [facility]) injection 4 mg  4 mg Intravenous Q6H PRN Scotty Monge MD        enoxaparin (LOVENOX) injection 40 mg  40 mg Subcutaneous Daily Scotty Monge MD        insulin lispro (HUMALOG) injection vial 0-6 Units  0-6 Units Subcutaneous TID WC Scotty Monge MD   1 Units at 12/03/20 1708    insulin lispro (HUMALOG) injection vial 0-3 Units  0-3 Units Subcutaneous Nightly Scotty Monge MD   1 Units at 12/03/20 2057      ?   ? REVIEW OF SYSTEMS:   All systems were reviewed and all were negative except for those mentioned in HPI. PHYSICAL EXAM:   Blood pressure (!) 177/81, pulse 58, temperature 97.8 °F (36.6 °C), resp. rate 16, height 6' 4\" (1.93 m), weight 240 lb 1.6 oz (108.9 kg), SpO2 97 %. . Body mass index is 29.23 kg/m². CONSTITUTIONAL: Not in acute distress  HENT: NC/AT Ear: normal, patent without effusion Nose: no deformities, nares patent  EYES:Conjunctiva normal. No discharge. NECK: Neck supple,No JVD /Thyromegaly/LAD   RESP:No chest wall deformities or tenderness. No wheezing or rales. B/L air entry positive+  CVS: Regular rate and rhythm. S1 and S2 normal, no murmurs, clicks, gallops or rubs  GI: Soft, ND/NT,No guarding/rebound/mass/organomegaly. Bowel sounds are normal.    MUSCULAR/EXT:  no pedal edema, no clubbing or cyanosis,Pulses 2+ B/L              Left foot dressing in place +  CNS: Awake, alert. Cranial nerves intact, no focal neurological deficits.    MOOD/PSYCH: Normal mood and affect  SKIN: Warm and dry,No rashes    Lab results:   Results for orders placed or performed during the hospital encounter of 12/03/20   COVID-19    Specimen: Nasopharynx/Oropharynx   Result Value Ref Range    Source NARES     SARS-CoV-2, NAAT NOT DETECTED    CBC   Result Value Ref Range    WBC 8.1 4.0 - 10.5 K/CU MM    RBC 4.42 (L) 4.6 - 6.2 M/CU MM    Hemoglobin 11.7 (L) 13.5 - 18.0 GM/DL    Hematocrit 37.6 (L) 42 - 52 %    MCV 85.1 78 - 100 FL    MCH 26.5 (L) 27 - 31 PG    MCHC 31.1 (L) 32.0 - 36.0 %    RDW 13.5 11.7 - 14.9 %    Platelets 371 971 - 235 K/CU MM    MPV 10.8 7.5 - 11.1 FL   Basic metabolic panel   Result Value Ref Range    Sodium 141 135 - 145 MMOL/L    Potassium 4.3 3.5 - 5.1 MMOL/L    Chloride 106 99 - 110 mMol/L    CO2 28 21 - 32 MMOL/L    Anion Gap 7 4 - 16    BUN 18 6 - 23 MG/DL    CREATININE 1.0 0.9 - 1.3 MG/DL    Glucose 167 (H) 70 - 99 MG/DL    Calcium 10.5 8.3 - 10.6 MG/DL    GFR Non-African American >60 >60 mL/min/1.73m2    GFR African American >60 >60 mL/min/1.73m2   Hepatic function panel   Result Value Ref Range    Alb 3.7 3.4 - 5.0 GM/DL    Total Bilirubin 0.4 0.0 - 1.0 MG/DL    Bilirubin, Direct 0.2 0.0 - 0.3 MG/DL    Bilirubin, Indirect 0.2 0 - 0.7 MG/DL    Alkaline Phosphatase 47 40 - 129 IU/L    AST 15 15 - 37 IU/L    ALT 17 10 - 40 U/L    Total Protein 6.7 6.4 - 8.2 GM/DL   Magnesium   Result Value Ref Range    Magnesium 1.8 1.8 - 2.4 mg/dl   C-reactive protein   Result Value Ref Range    CRP, High Sensitivity 52.3 mg/L   Procalcitonin   Result Value Ref Range    Procalcitonin 0.062    Sedimentation Rate   Result Value Ref Range    Sed Rate 58 (H) 0 - 20 MM/HR   Hemoglobin A1C   Result Value Ref Range    Hemoglobin A1C 7.4 (H) 4.2 - 6.3 %    eAG 166 mg/dL   Basic Metabolic Panel w/ Reflex to MG   Result Value Ref Range    Sodium 142 135 - 145 MMOL/L    Potassium 3.7 3.5 - 5.1 MMOL/L    Chloride 109 99 - 110 mMol/L    CO2 25 21 - 32 MMOL/L    Anion Gap 8 4 - 16    BUN 14 6 - 23 MG/DL    CREATININE 0.9 0.9 - 1.3 MG/DL    Glucose 176 (H) 70 - 99 MG/DL    Calcium 9.8 8.3 - 10.6 MG/DL    GFR Non-African American >60 >60 mL/min/1.73m2    GFR African American >60 >60 mL/min/1.73m2   CBC   Result Value Ref Range    WBC 7.2 4.0 - 10.5 K/CU MM    RBC 4.29 (L) 4.6 - 6.2 M/CU MM    Hemoglobin 11.4 (L) 13.5 - 18.0 GM/DL    Hematocrit 36.4 (L) 42 - 52 %    MCV 84.8 78 - 100 FL    MCH 26.6 (L) 27 - 31 PG    MCHC 31.3 (L) 32.0 - 36.0 %    RDW 13.5 11.7 - 14.9 %    Platelets 975 108 - 132 K/CU MM    MPV 11.1 7.5 - 11.1 FL   POCT Glucose   Result Value Ref Range    POC Glucose 164 (H) 70 - 99 MG/DL   POCT Glucose   Result Value Ref Range    POC Glucose 213 (H) 70 - 99 MG/DL   POCT Glucose   Result Value Ref Range    POC Glucose 125 (H) 70 - 99 MG/DL   POCT Glucose   Result Value Ref Range    POC Glucose 140 (H) 70 - 99 MG/DL     No orders to display     ASSESSMENT/IMPRESSION:     -Left foot/toe osteomyelitis started broad-spectrum ABX. Podiatry consult. Pain control, IVF and n.p.o. PMN for debridement. -Diabetes mellitus continue home glipizide. Hold Metformin and Januvia. Start SSI. Check A1c. Monitor Accu-Cheks. -HTN continue Norvasc and lisinopril. Follow vitals  -Hyperlipidemia continue statin. Stable  -History CVA continue aspirin, statin and good BP control. Supportive care  ? DVT prophylaxis: Lovenox. Old records reviewed. Medications reviewed with patient. Patient is a Full Code-discussed     All questions and concerns addressed at this time, and patient is in agreement with current treatment plan.      Sharifa Roque MD   Hospitalist at [facility]

## 2020-12-03 NOTE — CONSULTS
[facility]  consulted by Dr. Levar Ward for monitoring and adjustment. Indication for treatment: Foot infection, Hx of Osteomyelitis   Goal trough: 15 mcg/mL    Pertinent Laboratory Values:   Temp Readings from Last 3 Encounters:   12/03/20 98.2 °F (36.8 °C) (Infrared)   11/23/20 98.6 °F (37 °C) (Temporal)   09/30/20 96.9 °F (36.1 °C) (Temporal)     No results for input(s): WBC, LACTATE in the last 72 hours. No results for input(s): BUN, CREATININE in the last 72 hours. CrCl cannot be calculated (No successful lab value found. ). No intake or output data in the 24 hours ending 12/03/20 2702    Pertinent Cultures:  Date    Source    Results  12/3   Blood    Pending             Vancomycin level:   TROUGH:  No results for input(s): VANCOTROUGH in the last 72 hours. RANDOM:  No results for input(s): VANCORANDOM in the last 72 hours. Assessment:  WBC and temperature: n/a  SCr, BUN, and urine output: n/a  Day(s) of therapy:  1  Vancomycin concentration: TBD    Plan:  Give vancomycin 2gm ivpb total (2 x 1gm doses) tonight. Final dosing regimen will be determined once labs are available   Pharmacy will continue to monitor patient and adjust therapy as indicated    Thank you for the consult. Ronnie Ruiz   12/3/2020 5:15 PM

## 2020-12-04 ENCOUNTER — ANESTHESIA (OUTPATIENT)
Dept: OPERATING ROOM | Age: 55
DRG: 629 | End: 2020-12-04
Payer: COMMERCIAL

## 2020-12-04 ENCOUNTER — ANESTHESIA EVENT (OUTPATIENT)
Dept: OPERATING ROOM | Age: 55
DRG: 629 | End: 2020-12-04
Payer: COMMERCIAL

## 2020-12-04 VITALS
SYSTOLIC BLOOD PRESSURE: 143 MMHG | RESPIRATION RATE: 1 BRPM | OXYGEN SATURATION: 100 % | DIASTOLIC BLOOD PRESSURE: 77 MMHG | TEMPERATURE: 97.7 F

## 2020-12-04 LAB
ANION GAP SERPL CALCULATED.3IONS-SCNC: 8 MMOL/L (ref 4–16)
BUN BLDV-MCNC: 14 MG/DL (ref 6–23)
CALCIUM SERPL-MCNC: 9.8 MG/DL (ref 8.3–10.6)
CHLORIDE BLD-SCNC: 109 MMOL/L (ref 99–110)
CO2: 25 MMOL/L (ref 21–32)
CREAT SERPL-MCNC: 0.9 MG/DL (ref 0.9–1.3)
ESTIMATED AVERAGE GLUCOSE: 166 MG/DL
GFR AFRICAN AMERICAN: >60 ML/MIN/1.73M2
GFR NON-AFRICAN AMERICAN: >60 ML/MIN/1.73M2
GLUCOSE BLD-MCNC: 109 MG/DL (ref 70–99)
GLUCOSE BLD-MCNC: 125 MG/DL (ref 70–99)
GLUCOSE BLD-MCNC: 140 MG/DL (ref 70–99)
GLUCOSE BLD-MCNC: 176 MG/DL (ref 70–99)
GLUCOSE BLD-MCNC: 187 MG/DL (ref 70–99)
HBA1C MFR BLD: 7.4 % (ref 4.2–6.3)
HCT VFR BLD CALC: 36.4 % (ref 42–52)
HEMOGLOBIN: 11.4 GM/DL (ref 13.5–18)
HIGH SENSITIVE C-REACTIVE PROTEIN: 52.3 MG/L
MCH RBC QN AUTO: 26.6 PG (ref 27–31)
MCHC RBC AUTO-ENTMCNC: 31.3 % (ref 32–36)
MCV RBC AUTO: 84.8 FL (ref 78–100)
PDW BLD-RTO: 13.5 % (ref 11.7–14.9)
PLATELET # BLD: 245 K/CU MM (ref 140–440)
PMV BLD AUTO: 11.1 FL (ref 7.5–11.1)
POTASSIUM SERPL-SCNC: 3.7 MMOL/L (ref 3.5–5.1)
PROCALCITONIN: 0.06
RBC # BLD: 4.29 M/CU MM (ref 4.6–6.2)
SODIUM BLD-SCNC: 142 MMOL/L (ref 135–145)
WBC # BLD: 7.2 K/CU MM (ref 4–10.5)

## 2020-12-04 PROCEDURE — 85027 COMPLETE CBC AUTOMATED: CPT

## 2020-12-04 PROCEDURE — 2580000003 HC RX 258: Performed by: INTERNAL MEDICINE

## 2020-12-04 PROCEDURE — 6370000000 HC RX 637 (ALT 250 FOR IP): Performed by: INTERNAL MEDICINE

## 2020-12-04 PROCEDURE — 36415 COLL VENOUS BLD VENIPUNCTURE: CPT

## 2020-12-04 PROCEDURE — 2500000003 HC RX 250 WO HCPCS: Performed by: PODIATRIST

## 2020-12-04 PROCEDURE — 87186 SC STD MICRODIL/AGAR DIL: CPT

## 2020-12-04 PROCEDURE — 6360000002 HC RX W HCPCS: Performed by: INTERNAL MEDICINE

## 2020-12-04 PROCEDURE — 1200000000 HC SEMI PRIVATE

## 2020-12-04 PROCEDURE — 87070 CULTURE OTHR SPECIMN AEROBIC: CPT

## 2020-12-04 PROCEDURE — 87147 CULTURE TYPE IMMUNOLOGIC: CPT

## 2020-12-04 PROCEDURE — 6360000002 HC RX W HCPCS: Performed by: NURSE ANESTHETIST, CERTIFIED REGISTERED

## 2020-12-04 PROCEDURE — 87205 SMEAR GRAM STAIN: CPT

## 2020-12-04 PROCEDURE — 3600000002 HC SURGERY LEVEL 2 BASE: Performed by: PODIATRIST

## 2020-12-04 PROCEDURE — 80048 BASIC METABOLIC PNL TOTAL CA: CPT

## 2020-12-04 PROCEDURE — 3600000012 HC SURGERY LEVEL 2 ADDTL 15MIN: Performed by: PODIATRIST

## 2020-12-04 PROCEDURE — 3700000001 HC ADD 15 MINUTES (ANESTHESIA): Performed by: PODIATRIST

## 2020-12-04 PROCEDURE — 87075 CULTR BACTERIA EXCEPT BLOOD: CPT

## 2020-12-04 PROCEDURE — 6370000000 HC RX 637 (ALT 250 FOR IP): Performed by: PODIATRIST

## 2020-12-04 PROCEDURE — 0QBP0ZZ EXCISION OF LEFT METATARSAL, OPEN APPROACH: ICD-10-PCS | Performed by: INTERNAL MEDICINE

## 2020-12-04 PROCEDURE — 82962 GLUCOSE BLOOD TEST: CPT

## 2020-12-04 PROCEDURE — 2709999900 HC NON-CHARGEABLE SUPPLY: Performed by: PODIATRIST

## 2020-12-04 PROCEDURE — 87077 CULTURE AEROBIC IDENTIFY: CPT

## 2020-12-04 PROCEDURE — 3700000000 HC ANESTHESIA ATTENDED CARE: Performed by: PODIATRIST

## 2020-12-04 PROCEDURE — 94761 N-INVAS EAR/PLS OXIMETRY MLT: CPT

## 2020-12-04 RX ORDER — LIDOCAINE HYDROCHLORIDE 20 MG/ML
INJECTION, SOLUTION INTRAVENOUS PRN
Status: DISCONTINUED | OUTPATIENT
Start: 2020-12-04 | End: 2020-12-04 | Stop reason: SDUPTHER

## 2020-12-04 RX ORDER — DIAPER,BRIEF,INFANT-TODD,DISP
EACH MISCELLANEOUS
Status: COMPLETED | OUTPATIENT
Start: 2020-12-04 | End: 2020-12-04

## 2020-12-04 RX ORDER — PROPOFOL 10 MG/ML
INJECTION, EMULSION INTRAVENOUS PRN
Status: DISCONTINUED | OUTPATIENT
Start: 2020-12-04 | End: 2020-12-04 | Stop reason: SDUPTHER

## 2020-12-04 RX ORDER — FENTANYL CITRATE 50 UG/ML
INJECTION, SOLUTION INTRAMUSCULAR; INTRAVENOUS PRN
Status: DISCONTINUED | OUTPATIENT
Start: 2020-12-04 | End: 2020-12-04 | Stop reason: SDUPTHER

## 2020-12-04 RX ADMIN — PROPOFOL 50 MG: 10 INJECTION, EMULSION INTRAVENOUS at 13:52

## 2020-12-04 RX ADMIN — PIPERACILLIN AND TAZOBACTAM 3.38 G: 3; .375 INJECTION, POWDER, LYOPHILIZED, FOR SOLUTION INTRAVENOUS at 13:26

## 2020-12-04 RX ADMIN — FENTANYL CITRATE 100 MCG: 50 INJECTION INTRAMUSCULAR; INTRAVENOUS at 13:26

## 2020-12-04 RX ADMIN — PROPOFOL 50 MG: 10 INJECTION, EMULSION INTRAVENOUS at 14:07

## 2020-12-04 RX ADMIN — PROPOFOL 50 MG: 10 INJECTION, EMULSION INTRAVENOUS at 13:40

## 2020-12-04 RX ADMIN — PROPOFOL 50 MG: 10 INJECTION, EMULSION INTRAVENOUS at 14:09

## 2020-12-04 RX ADMIN — LIDOCAINE HYDROCHLORIDE 100 MG: 20 INJECTION, SOLUTION INTRAVENOUS at 13:26

## 2020-12-04 RX ADMIN — INSULIN LISPRO 1 UNITS: 100 INJECTION, SOLUTION INTRAVENOUS; SUBCUTANEOUS at 21:13

## 2020-12-04 RX ADMIN — HYDROCODONE BITARTRATE AND ACETAMINOPHEN 1 TABLET: 5; 325 TABLET ORAL at 21:30

## 2020-12-04 RX ADMIN — PIPERACILLIN AND TAZOBACTAM 3.38 G: 3; .375 INJECTION, POWDER, LYOPHILIZED, FOR SOLUTION INTRAVENOUS at 01:00

## 2020-12-04 RX ADMIN — VANCOMYCIN HYDROCHLORIDE 1500 MG: 750 INJECTION, POWDER, LYOPHILIZED, FOR SOLUTION INTRAVENOUS at 07:55

## 2020-12-04 RX ADMIN — LISINOPRIL 40 MG: 40 TABLET ORAL at 16:07

## 2020-12-04 RX ADMIN — PROPOFOL 50 MG: 10 INJECTION, EMULSION INTRAVENOUS at 14:23

## 2020-12-04 RX ADMIN — SODIUM CHLORIDE: 9 INJECTION, SOLUTION INTRAVENOUS at 05:55

## 2020-12-04 RX ADMIN — AMLODIPINE BESYLATE 10 MG: 10 TABLET ORAL at 16:06

## 2020-12-04 RX ADMIN — PIPERACILLIN AND TAZOBACTAM 3.38 G: 3; .375 INJECTION, POWDER, LYOPHILIZED, FOR SOLUTION INTRAVENOUS at 17:52

## 2020-12-04 RX ADMIN — ASPIRIN 81 MG 81 MG: 81 TABLET ORAL at 16:07

## 2020-12-04 RX ADMIN — PROPOFOL 50 MG: 10 INJECTION, EMULSION INTRAVENOUS at 14:29

## 2020-12-04 RX ADMIN — ATORVASTATIN CALCIUM 40 MG: 40 TABLET, FILM COATED ORAL at 21:18

## 2020-12-04 RX ADMIN — TOPIRAMATE 50 MG: 25 TABLET, FILM COATED ORAL at 21:13

## 2020-12-04 RX ADMIN — GLIPIZIDE 10 MG: 10 TABLET ORAL at 16:07

## 2020-12-04 RX ADMIN — TOPIRAMATE 50 MG: 25 TABLET, FILM COATED ORAL at 16:07

## 2020-12-04 RX ADMIN — VANCOMYCIN HYDROCHLORIDE 1500 MG: 750 INJECTION, POWDER, LYOPHILIZED, FOR SOLUTION INTRAVENOUS at 21:33

## 2020-12-04 RX ADMIN — PROPOFOL 50 MG: 10 INJECTION, EMULSION INTRAVENOUS at 13:35

## 2020-12-04 RX ADMIN — HYDROCODONE BITARTRATE AND ACETAMINOPHEN 1 TABLET: 5; 325 TABLET ORAL at 15:28

## 2020-12-04 RX ADMIN — PROPOFOL 50 MG: 10 INJECTION, EMULSION INTRAVENOUS at 13:26

## 2020-12-04 RX ADMIN — PROPOFOL 50 MG: 10 INJECTION, EMULSION INTRAVENOUS at 14:15

## 2020-12-04 RX ADMIN — PROPOFOL 50 MG: 10 INJECTION, EMULSION INTRAVENOUS at 14:02

## 2020-12-04 RX ADMIN — PROPOFOL 50 MG: 10 INJECTION, EMULSION INTRAVENOUS at 13:28

## 2020-12-04 ASSESSMENT — PAIN SCALES - GENERAL
PAINLEVEL_OUTOF10: 6
PAINLEVEL_OUTOF10: 4
PAINLEVEL_OUTOF10: 6
PAINLEVEL_OUTOF10: 6

## 2020-12-04 ASSESSMENT — PULMONARY FUNCTION TESTS
PIF_VALUE: 1
PIF_VALUE: 0
PIF_VALUE: 1
PIF_VALUE: 0
PIF_VALUE: 1
PIF_VALUE: 0
PIF_VALUE: 1

## 2020-12-04 ASSESSMENT — LIFESTYLE VARIABLES: SMOKING_STATUS: 0

## 2020-12-04 ASSESSMENT — PAIN DESCRIPTION - DESCRIPTORS: DESCRIPTORS: SHARP;SHOOTING

## 2020-12-04 ASSESSMENT — PAIN DESCRIPTION - PAIN TYPE
TYPE: SURGICAL PAIN

## 2020-12-04 ASSESSMENT — PAIN - FUNCTIONAL ASSESSMENT: PAIN_FUNCTIONAL_ASSESSMENT: ACTIVITIES ARE NOT PREVENTED

## 2020-12-04 ASSESSMENT — PAIN DESCRIPTION - PROGRESSION
CLINICAL_PROGRESSION: GRADUALLY WORSENING
CLINICAL_PROGRESSION: GRADUALLY IMPROVING

## 2020-12-04 ASSESSMENT — PAIN DESCRIPTION - ONSET
ONSET: ON-GOING
ONSET: GRADUAL

## 2020-12-04 ASSESSMENT — PAIN DESCRIPTION - FREQUENCY
FREQUENCY: INTERMITTENT
FREQUENCY: INTERMITTENT

## 2020-12-04 ASSESSMENT — PAIN DESCRIPTION - LOCATION: LOCATION: FOOT

## 2020-12-04 NOTE — PLAN OF CARE
Problem: Falls - Risk of:  Goal: Will remain free from falls  Description: Will remain free from falls  Outcome: Ongoing  Goal: Absence of physical injury  Description: Absence of physical injury  Outcome: Ongoing     Problem: Pain:  Description: Pain management should include both nonpharmacologic and pharmacologic interventions.   Goal: Pain level will decrease  Description: Pain level will decrease  Outcome: Ongoing  Goal: Control of acute pain  Description: Control of acute pain  Outcome: Ongoing  Goal: Control of chronic pain  Description: Control of chronic pain  Outcome: Ongoing     Problem: Discharge Planning:  Goal: Discharged to appropriate level of care  Description: Discharged to appropriate level of care  Outcome: Ongoing     Problem: Serum Glucose Level - Abnormal:  Goal: Ability to maintain appropriate glucose levels will improve  Description: Ability to maintain appropriate glucose levels will improve  Outcome: Ongoing     Problem: Sensory Perception - Impaired:  Goal: Ability to maintain a stable neurologic state will improve  Description: Ability to maintain a stable neurologic state will improve  Outcome: Ongoing

## 2020-12-04 NOTE — CONSULTS
PHARMACY VANCOMYCIN MONITORING SERVICE    Roscoe Castañeda is a 63 y.o. male started on vancomycin for foot infection and history of osteomyelitis. Pharmacy consulted by Dr. Marilia Adkins for monitoring and adjustment. Indication for treatment: History of foot infection and osteomyelitis  Goal trough: 15 mcg/mL  Other antimicrobials: Piperacillin-Tazobactam    Ht Readings from Last 1 Encounters:   12/03/20 6' 4\" (1.93 m)     Wt Readings from Last 3 Encounters:   12/03/20 240 lb 1.6 oz (108.9 kg)   11/23/20 255 lb (115.7 kg)   09/30/20 253 lb (114.8 kg)        Pertinent Laboratory Values:   Temp Readings from Last 3 Encounters:   12/03/20 98.2 °F (36.8 °C) (Infrared)   11/23/20 98.6 °F (37 °C) (Temporal)   09/30/20 96.9 °F (36.1 °C) (Temporal)     Recent Labs     12/03/20  1730   WBC 8.1     Recent Labs     12/03/20  1730   BUN 18   CREATININE 1.0     Estimated Creatinine Clearance: 112 mL/min (based on SCr of 1 mg/dL). No intake or output data in the 24 hours ending 12/03/20 1950    Pertinent Cultures:  Date    Source    Results  12/3/20  Blood    Collected    Previous vancomycin levels:  TROUGH:  No results for input(s): VANCOTROUGH in the last 72 hours. RANDOM:  No results for input(s): VANCORANDOM in the last 72 hours. Assessment/Plan:  Vancomycin loading dose of 2,000mg will be given followed by a regimen of vancomycin 1,500mg q12h. Pharmacy will continue to monitor patient and adjust therapy as indicated    VANCOMYCIN [address] [place] [address] [address] 12/5/2020 @ 0830    Thank you for the consult.   Jeane Soriano [facility]  12/3/2020 7:50 PM

## 2020-12-04 NOTE — CARE COORDINATION
CM met with the patient for discharge planning. Patient stated that he lives at home with his wife in [place], has insurance with Rx coverage & PCP, stated that he was independent with ADL's prior to admission, and still drives. Patient stated that he has been using a walker at home and does not require home oxygen. Patient stated that he is having a surgical procedure with Dr. Shira Maravilla this morning. Patient plans to return home upon discharge and is unable to identify any needs at this time. CM will follow.

## 2020-12-04 NOTE — PROGRESS NOTES
Hospitalist Progress Note         Admit Date: 12/3/2020    PCP: Joaquim Mcguire     No chief complaint on file. Assessment and Plan:     -Left foot/toe osteomyelitis continue broad-spectrum ABX. Podiatry on board. S/p debridement postop day 0 . Pain control, weightbearing activity per Podiatry  -Diabetes mellitus continue home glipizide. Hold Metformin and Januvia. Continue SSI. HbA1c 7.4. Monitor Accu-Cheks. -HTN continue Norvasc and lisinopril. Follow vitals  -Hyperlipidemia continue statin. Stable  -History CVA continue aspirin, statin and good BP control. Supportive care   ? VTE prophylaxis LMWH.     Current Facility-Administered Medications   Medication Dose Route Frequency Provider Last Rate Last Dose    vancomycin (VANCOCIN) 1,500 mg in sodium chloride 0.9 % 500 mL IVPB  1,500 mg Intravenous Q12H Sharifa Roque MD   Stopped at 12/04/20 0955    piperacillin-tazobactam (ZOSYN) 3.375 g in dextrose 5 % 50 mL IVPB (mini-bag)  3.375 g Intravenous Q6H Sharifa Roque MD        influenza quadrivalent split vaccine (FLUZONE;FLUARIX;FLULAVAL;AFLURIA) injection 0.5 mL  0.5 mL Intramuscular Prior to discharge Sharifa Roque MD        amLODIPine (NORVASC) tablet 10 mg  10 mg Oral Daily Sharifa Roque MD   10 mg at 12/03/20 1702    aspirin chewable tablet 81 mg  81 mg Oral Daily Sharifa Roque MD   81 mg at 12/03/20 1702    atorvastatin (LIPITOR) tablet 40 mg  40 mg Oral Daily Sharifa Roque MD   40 mg at 12/03/20 1702    glipiZIDE (GLUCOTROL) tablet 10 mg  10 mg Oral BID AC Shariaf Roque MD   10 mg at 12/03/20 1702    HYDROcodone-acetaminophen (NORCO) 5-325 MG per tablet 1 tablet  1 tablet Oral Q6H PRN Sharifa Roque MD   1 tablet at 12/03/20 1702    lisinopril (PRINIVIL;ZESTRIL) tablet 40 mg  40 mg Oral Daily Sharifa Rqoue MD   40 mg at 12/03/20 1702    topiramate (TOPAMAX) tablet 50 mg  50 mg Oral BID Sharifa Roque MD   50 mg at 12/03/20 2057    glucose (GLUTOSE) 40 % oral gel 15 g  15 g Oral PRN Karen Warren MD        dextrose 50 % IV solution  12.5 g Intravenous PRN Karen Warren MD        glucagon (rDNA) injection 1 mg  1 mg Intramuscular PRN Karen Warren MD        dextrose 5 % solution  100 mL/hr Intravenous PRN Karen Warren MD        0.9 % sodium chloride infusion   Intravenous Continuous Karen Warren MD 75 mL/hr at 12/04/20 0555      sodium chloride flush 0.9 % injection 10 mL  10 mL Intravenous 2 times per day Karen Warren MD        sodium chloride flush 0.9 % injection 10 mL  10 mL Intravenous PRN Karen Warren MD        potassium chloride 10 mEq/100 mL IVPB (Peripheral Line)  10 mEq Intravenous PRN Karen Warren MD        magnesium sulfate 1 g in dextrose 5% 100 mL IVPB  1 g Intravenous PRN Karen Warren MD        acetaminophen (TYLENOL) tablet 650 mg  650 mg Oral Q6H PRN Karen Warrne MD        Or    acetaminophen (TYLENOL) suppository 650 mg  650 mg Rectal Q6H PRN Karen Warren MD        polyethylene glycol [facility]) packet 17 g  17 g Oral Daily PRN Karen Warren MD        promethazine (PHENERGAN) tablet 12.5 mg  12.5 mg Oral Q6H PRN Karen Warren MD        Or    ondansetron [facility]) injection 4 mg  4 mg Intravenous Q6H PRN Karen Warren MD        enoxaparin (LOVENOX) injection 40 mg  40 mg Subcutaneous Daily Karen Warren MD        insulin lispro (HUMALOG) injection vial 0-6 Units  0-6 Units Subcutaneous TID WC Karen Warren MD   1 Units at 12/03/20 1708    insulin lispro (HUMALOG) injection vial 0-3 Units  0-3 Units Subcutaneous Nightly Karen Warren MD   1 Units at 12/03/20 2057       Subjective:     S/p debridement today  No new complaints except for postop pain  No acute overnight events    Objective:        Intake/Output Summary (Last 24 hours) at 12/4/2020 1221  Last data filed at 12/4/2020 1130  Gross per 24 hour   Intake 1905.2 ml   Output 2650 ml   Net -744.8 ml

## 2020-12-04 NOTE — ANESTHESIA PRE PROCEDURE
Department of Anesthesiology  Preprocedure Note       Name:  Melanie Devlin   Age:  63 y.o.  :  1965                                          MRN:  [id number]         Date:  2020      Surgeon: Jameel Sequeira):  Brice Angel DPM    Procedure: Procedure(s):  FOOT DEBRIDEMENT INCISION AND DRAINAGE    Medications prior to admission:   Prior to Admission medications    Medication Sig Start Date End Date Taking? Authorizing Provider   topiramate (TOPAMAX) 50 MG tablet Take 50 mg by mouth 2 times daily    Historical Provider, MD   doxycycline hyclate (VIBRA-TABS) 100 MG tablet Take 100 mg by mouth 2 times daily 12/1/20 12/15/20  Historical Provider, MD   levoFLOXacin (LEVAQUIN) 500 MG tablet Take 500 mg by mouth daily 20  Historical Provider, MD   HYDROcodone-acetaminophen (NORCO) 5-325 MG per tablet Take 1 tablet by mouth every 8 hours as needed for Pain. Historical Provider, MD   acetaminophen (TYLENOL) 500 MG tablet Take 500 mg by mouth every 6 hours as needed for Pain (headache)    Historical Provider, MD   lisinopril (PRINIVIL;ZESTRIL) 40 MG tablet Take 40 mg by mouth daily    Historical Provider, MD   SITagliptin (JANUVIA) 50 MG tablet Take 50 mg by mouth daily    Historical Provider, MD   amLODIPine (NORVASC) 10 MG tablet Take 10 mg by mouth daily    Historical Provider, MD   aspirin 81 MG chewable tablet Take 81 mg by mouth daily    Historical Provider, MD   atorvastatin (LIPITOR) 10 MG tablet Take 40 mg by mouth daily    Historical Provider, MD   glipiZIDE (GLUCOTROL) 10 MG tablet Take 10 mg by mouth 2 times daily (before meals)    Historical Provider, MD   metFORMIN (GLUCOPHAGE) 1000 MG tablet Take 1,000 mg by mouth 2 times daily     Historical Provider, MD   metFORMIN (GLUCOPHAGE) 500 MG tablet Take 500 mg by mouth every evening     Historical Provider, MD       Current medications:    No current facility-administered medications for this visit. No current outpatient medications on file. Debi Bunn MD        potassium chloride 10 mEq/100 mL IVPB (Peripheral Line)  10 mEq Intravenous PRN Yoel Maciel MD        magnesium sulfate 1 g in dextrose 5% 100 mL IVPB  1 g Intravenous PRN Yoel Maciel MD        acetaminophen (TYLENOL) tablet 650 mg  650 mg Oral Q6H PRN Yoel Maciel MD        Or    acetaminophen (TYLENOL) suppository 650 mg  650 mg Rectal Q6H PRN Yoel Maciel MD        polyethylene glycol [facility]) packet 17 g  17 g Oral Daily PRN Yoel Maciel MD        promethazine (PHENERGAN) tablet 12.5 mg  12.5 mg Oral Q6H PRN Yoel Maciel MD        Or    ondansetron [facility]) injection 4 mg  4 mg Intravenous Q6H PRN Yoel Maciel MD        enoxaparin (LOVENOX) injection 40 mg  40 mg Subcutaneous Daily Yoel Maciel MD        insulin lispro (HUMALOG) injection vial 0-6 Units  0-6 Units Subcutaneous TID WC Yoel Maciel MD   1 Units at 208    insulin lispro (HUMALOG) injection vial 0-3 Units  0-3 Units Subcutaneous Nightly Yoel Maciel MD   1 Units at 20       Allergies:  No Known Allergies    Problem List:    Patient Active Problem List   Diagnosis Code    Toe osteomyelitis, left ([facility] [address] M86.9       Past Medical History:        Diagnosis Date    Cerebral artery occlusion with cerebral infarction ([facility] [address]     Diabetes mellitus ([facility] [address]     Hx of blood clots     Hyperlipidemia     Hypertension        Past Surgical History:        Procedure Laterality Date    TOE AMPUTATION Bilateral 2020    RIGHT HALLUX AMPUTATION AND PARTIAL AMPUTATION DISTAL LEFT 2ND TOE performed by Lexie Franco DPM at [facility] TOE AMPUTATION Left 2020    LEFT 2ND TOE AMPUTATION performed by Lexie Franco DPM at [address] [place] [address]       Social History:    Social History     Tobacco Use    Smoking status: Former Smoker     Packs/day: 1.00     Types: Cigarettes     Last attempt to quit: 2000     Years since quittin.1    Smokeless tobacco: Former User   Substance Use I  TM distance: >3 FB   Neck ROM: full  Mouth opening: > = 3 FB Dental:    (+) lower dentures  Comment: Poor dentition, denies loose/chipped teeth    Pulmonary: breath sounds clear to auscultation      (-) not a current smoker ( Quit Smokin00)                           Cardiovascular:  Exercise tolerance: good (>4 METS),   (+) hypertension: moderate, hyperlipidemia        Rhythm: regular  Rate: normal           Beta Blocker:  Not on Beta Blocker         Neuro/Psych:   (+) CVA (x2, last 2 years ago, \"balance issues\" with general weakness on both sides): residual symptoms,             GI/Hepatic/Renal: Neg GI/Hepatic/Renal ROS            Endo/Other:    (+) DiabetesType II DM, poorly controlled, , .          Pt had no PAT visit       Abdominal:           Vascular:   + DVT, . Anesthesia Plan      MAC     ASA 3     (Rapid COVID/repeat covid  - )  Induction: intravenous. MIPS: Prophylactic antiemetics administered. Anesthetic plan and risks discussed with patient.       Plan discussed with CRNA and surgical team.                ANDERS Wagner - CRNA   2020

## 2020-12-04 NOTE — ANESTHESIA POSTPROCEDURE EVALUATION
Department of Anesthesiology  Postprocedure Note    Patient: Karma Knowles  MRN: [id number]  [place]: 1965  Date of evaluation: 12/4/2020  Time:  3:06 PM     Procedure Summary     Date:  12/04/20 Room / Location:  [address] [place] [address] 01 / [facility]    Anesthesia Start:  6184 Anesthesia Stop:  3526    Procedure:  FOOT DEBRIDEMENT INCISION AND DRAINAGE (Left Foot) Diagnosis:  (wound infection)    Surgeon:  Enzo Godinez DPM Responsible Provider:  ANDERS Caceres CRNA    Anesthesia Type:  MAC ASA Status:  3          Anesthesia Type: MAC    Luis Enrique Phase I:      Luis Enrique Phase II:      Last vitals: Reviewed and per EMR flowsheets.        Anesthesia Post Evaluation    Patient location during evaluation: bedside  Patient participation: complete - patient participated  Level of consciousness: awake and alert  Pain score: 3  Airway patency: patent  Nausea & Vomiting: no vomiting and no nausea  Complications: no  Cardiovascular status: blood pressure returned to baseline  Respiratory status: acceptable  Hydration status: euvolemic

## 2020-12-05 LAB
DOSE AMOUNT: ABNORMAL
DOSE TIME: ABNORMAL
GLUCOSE BLD-MCNC: 138 MG/DL (ref 70–99)
GLUCOSE BLD-MCNC: 177 MG/DL (ref 70–99)
GLUCOSE BLD-MCNC: 200 MG/DL (ref 70–99)
VANCOMYCIN TROUGH: 9.9 UG/ML (ref 10–20)

## 2020-12-05 PROCEDURE — 6370000000 HC RX 637 (ALT 250 FOR IP): Performed by: INTERNAL MEDICINE

## 2020-12-05 PROCEDURE — 6360000002 HC RX W HCPCS: Performed by: INTERNAL MEDICINE

## 2020-12-05 PROCEDURE — 80202 ASSAY OF VANCOMYCIN: CPT

## 2020-12-05 PROCEDURE — 1200000000 HC SEMI PRIVATE

## 2020-12-05 PROCEDURE — 2580000003 HC RX 258: Performed by: INTERNAL MEDICINE

## 2020-12-05 PROCEDURE — 6370000000 HC RX 637 (ALT 250 FOR IP): Performed by: PODIATRIST

## 2020-12-05 PROCEDURE — 36415 COLL VENOUS BLD VENIPUNCTURE: CPT

## 2020-12-05 PROCEDURE — 82962 GLUCOSE BLOOD TEST: CPT

## 2020-12-05 RX ORDER — GINSENG 100 MG
CAPSULE ORAL PRN
Status: DISCONTINUED | OUTPATIENT
Start: 2020-12-05 | End: 2020-12-07 | Stop reason: HOSPADM

## 2020-12-05 RX ORDER — GINSENG 100 MG
CAPSULE ORAL ONCE
Status: COMPLETED | OUTPATIENT
Start: 2020-12-05 | End: 2020-12-05

## 2020-12-05 RX ADMIN — SODIUM CHLORIDE: 9 INJECTION, SOLUTION INTRAVENOUS at 04:41

## 2020-12-05 RX ADMIN — TOPIRAMATE 50 MG: 25 TABLET, FILM COATED ORAL at 09:27

## 2020-12-05 RX ADMIN — VANCOMYCIN HYDROCHLORIDE 1500 MG: 750 INJECTION, POWDER, LYOPHILIZED, FOR SOLUTION INTRAVENOUS at 10:30

## 2020-12-05 RX ADMIN — AMLODIPINE BESYLATE 10 MG: 10 TABLET ORAL at 09:27

## 2020-12-05 RX ADMIN — VANCOMYCIN HYDROCHLORIDE 1500 MG: 750 INJECTION, POWDER, LYOPHILIZED, FOR SOLUTION INTRAVENOUS at 20:22

## 2020-12-05 RX ADMIN — GLIPIZIDE 10 MG: 10 TABLET ORAL at 09:32

## 2020-12-05 RX ADMIN — LISINOPRIL 40 MG: 40 TABLET ORAL at 09:27

## 2020-12-05 RX ADMIN — HYDROCODONE BITARTRATE AND ACETAMINOPHEN 1 TABLET: 5; 325 TABLET ORAL at 04:36

## 2020-12-05 RX ADMIN — INSULIN LISPRO 1 UNITS: 100 INJECTION, SOLUTION INTRAVENOUS; SUBCUTANEOUS at 20:47

## 2020-12-05 RX ADMIN — PIPERACILLIN AND TAZOBACTAM 3.38 G: 3; .375 INJECTION, POWDER, LYOPHILIZED, FOR SOLUTION INTRAVENOUS at 14:42

## 2020-12-05 RX ADMIN — PIPERACILLIN AND TAZOBACTAM 3.38 G: 3; .375 INJECTION, POWDER, LYOPHILIZED, FOR SOLUTION INTRAVENOUS at 06:35

## 2020-12-05 RX ADMIN — BACITRACIN 2 PACKAGE: 500 OINTMENT TOPICAL at 09:33

## 2020-12-05 RX ADMIN — ASPIRIN 81 MG 81 MG: 81 TABLET ORAL at 09:27

## 2020-12-05 RX ADMIN — GLIPIZIDE 10 MG: 10 TABLET ORAL at 16:31

## 2020-12-05 RX ADMIN — INSULIN LISPRO 2 UNITS: 100 INJECTION, SOLUTION INTRAVENOUS; SUBCUTANEOUS at 17:13

## 2020-12-05 RX ADMIN — ATORVASTATIN CALCIUM 40 MG: 40 TABLET, FILM COATED ORAL at 09:27

## 2020-12-05 RX ADMIN — SODIUM CHLORIDE: 9 INJECTION, SOLUTION INTRAVENOUS at 22:13

## 2020-12-05 RX ADMIN — PIPERACILLIN AND TAZOBACTAM 3.38 G: 3; .375 INJECTION, POWDER, LYOPHILIZED, FOR SOLUTION INTRAVENOUS at 00:18

## 2020-12-05 RX ADMIN — TOPIRAMATE 50 MG: 25 TABLET, FILM COATED ORAL at 20:46

## 2020-12-05 RX ADMIN — PIPERACILLIN AND TAZOBACTAM 3.38 G: 3; .375 INJECTION, POWDER, LYOPHILIZED, FOR SOLUTION INTRAVENOUS at 18:04

## 2020-12-05 RX ADMIN — ENOXAPARIN SODIUM 40 MG: 40 INJECTION SUBCUTANEOUS at 09:32

## 2020-12-05 ASSESSMENT — PAIN DESCRIPTION - ONSET: ONSET: ON-GOING

## 2020-12-05 ASSESSMENT — PAIN SCALES - GENERAL
PAINLEVEL_OUTOF10: 3
PAINLEVEL_OUTOF10: 0
PAINLEVEL_OUTOF10: 0

## 2020-12-05 ASSESSMENT — PAIN - FUNCTIONAL ASSESSMENT
PAIN_FUNCTIONAL_ASSESSMENT: PREVENTS OR INTERFERES SOME ACTIVE ACTIVITIES AND ADLS
PAIN_FUNCTIONAL_ASSESSMENT: ACTIVITIES ARE NOT PREVENTED

## 2020-12-05 ASSESSMENT — PAIN DESCRIPTION - LOCATION: LOCATION: FOOT

## 2020-12-05 ASSESSMENT — PAIN DESCRIPTION - FREQUENCY: FREQUENCY: INTERMITTENT

## 2020-12-05 ASSESSMENT — PAIN DESCRIPTION - PROGRESSION: CLINICAL_PROGRESSION: GRADUALLY WORSENING

## 2020-12-05 NOTE — OP NOTE
softness to the metatarsal head and  neck was noted on the operative field. Using sharp dissection, the infected soft tissue in the area was removed  and passed from the field. 500 mL of pulse lavage with gentamicin was  utilized at this time. Using osteotome and mallet, the distal half of the second metatarsal to  the level of good hard bone was transected in toto. The distal half of  the second metatarsal was then freed from its soft tissue and passed  from the field. It is noted that the distal aspect of the head and neck  of the second metatarsal crumbled during this process because of the  softness from the osteomyelitis and was taken out in several pieces. The area was inspected to make sure all bone that needed to come out was  passed from the operative field. Final 500 mL of pulse lavage with  gentamicin was utilized at this time. Upon inspection, no other  remaining necrotic or infected tissue was noted on the operative field. Bone wax was utilized to cap off the transected area of the metatarsal.    Capsular tissue was reapproximated and maintained using 3-0 Vicryl in a  simple interrupted fashion after insertion of a 1/4-inch Penrose drain  to this area. Subcutaneous tissue was reapproximated and maintained  using 3-0 Vicryl in a simple interrupted fashion. Superficial  subcutaneous tissue was reapproximated and maintained using 4-0 Vicryl  in a simple interrupted fashion. Skin was reapproximated and maintained  using 4-0 nylon in a horizontal mattress-type fashion. The end of the  Penrose drain came out of the distal aspect of the incision at this  time. Dressing comprised of antibiotic ointment, Adaptic, fluff, Kerlix, and  Ace wrap. Ankle tourniquet was deflated for a total tourniquet time of  76 minutes. Upon deflation, circulation immediately returned to distal  aspect of the left foot.     The patient tolerated the procedure and anesthesia well and left the OR  with vital

## 2020-12-05 NOTE — PROGRESS NOTES
Spoke with Luci Terry, at [facility] regarding patients [facility] and that  wanted pt to have IV antibiotics at home. Vanesa Corona will start the palnning.

## 2020-12-05 NOTE — CARE COORDINATION
Received call from pt's RN Susi Michelle stating pt needs IV atb set up and plan on discharge Monday. Reviewed chart and no orders or Documentation of what iv atb pt will need or for how long. Message left for Susi Michelle that IV atb need to be determined that CM will see on Monday.

## 2020-12-05 NOTE — PLAN OF CARE
Problem: Falls - Risk of:  Goal: Will remain free from falls  Description: Will remain free from falls  12/5/2020 1557 by Negrito Chavez RN  Outcome: Ongoing  12/5/2020 0757 by Tona Jensen RN  Outcome: Ongoing  Goal: Absence of physical injury  Description: Absence of physical injury  12/5/2020 1557 by Negrito Chavez RN  Outcome: Ongoing  12/5/2020 0757 by Tona Jensen RN  Outcome: Ongoing

## 2020-12-05 NOTE — PROGRESS NOTES
Hospitalist Progress Note         Admit Date: 12/3/2020    PCP: Brenda Russell     No chief complaint on file. Assessment and Plan:     -Left foot/toe osteomyelitis continue broad-spectrum ABX. Podiatry on board. S/p debridement postop day 1 . Pain control, weightbearing activity per Podiatry  -Diabetes mellitus continue home glipizide. Hold Metformin and Januvia. Continue SSI. HbA1c 7.4. Monitor Accu-Cheks. -HTN continue Norvasc and lisinopril. Follow vitals  -Hyperlipidemia continue statin. Stable  -History CVA continue aspirin, statin and good BP control. Supportive care   ? VTE prophylaxis LMWH.     Current Facility-Administered Medications   Medication Dose Route Frequency Provider Last Rate Last Dose    bacitracin ointment   Topical PRN Javan Levy DPM        vancomycin (VANCOCIN) 1,500 mg in sodium chloride 0.9 % 500 mL IVPB  1,500 mg Intravenous Q12H Radha Feliz  mL/hr at 12/05/20 1030 1,500 mg at 12/05/20 1030    piperacillin-tazobactam (ZOSYN) 3.375 g in dextrose 5 % 50 mL IVPB (mini-bag)  3.375 g Intravenous Q6H Radha Feliz MD   Stopped at 12/05/20 6733    influenza quadrivalent split vaccine (FLUZONE;FLUARIX;FLULAVAL;AFLURIA) injection 0.5 mL  0.5 mL Intramuscular Prior to discharge Radha Feliz MD        amLODIPine (NORVASC) tablet 10 mg  10 mg Oral Daily Radha Feliz MD   10 mg at 12/05/20 3422    aspirin chewable tablet 81 mg  81 mg Oral Daily Radha Feliz MD   81 mg at 12/05/20 9284    atorvastatin (LIPITOR) tablet 40 mg  40 mg Oral Daily Radha Feliz MD   40 mg at 12/05/20 7118    glipiZIDE (GLUCOTROL) tablet 10 mg  10 mg Oral BID AC Radha Feliz MD   10 mg at 12/05/20 0932    HYDROcodone-acetaminophen (NORCO) 5-325 MG per tablet 1 tablet  1 tablet Oral Q6H PRN Radha Feliz MD   1 tablet at 12/05/20 0436    lisinopril (PRINIVIL;ZESTRIL) tablet 40 mg  40 mg Oral Daily Radha Feliz MD   40 mg at 12/05/20 6833    topiramate (TOPAMAX) tablet 50 mg  50 mg Oral BID Braulio Bee MD   50 mg at 12/05/20 0927    glucose (GLUTOSE) 40 % oral gel 15 g  15 g Oral PRN Braulio Bee MD        dextrose 50 % IV solution  12.5 g Intravenous PRN Braulio Bee MD        glucagon (rDNA) injection 1 mg  1 mg Intramuscular PRN Braulio Bee MD        dextrose 5 % solution  100 mL/hr Intravenous PRN Braulio Bee MD        0.9 % sodium chloride infusion   Intravenous Continuous Braulio Bee MD 75 mL/hr at 12/05/20 0441      sodium chloride flush 0.9 % injection 10 mL  10 mL Intravenous 2 times per day Braulio Bee MD        sodium chloride flush 0.9 % injection 10 mL  10 mL Intravenous PRN Braulio Bee MD        potassium chloride 10 mEq/100 mL IVPB (Peripheral Line)  10 mEq Intravenous PRN Braulio Bee MD        magnesium sulfate 1 g in dextrose 5% 100 mL IVPB  1 g Intravenous PRN Braulio Bee MD        acetaminophen (TYLENOL) tablet 650 mg  650 mg Oral Q6H PRN Braulio Bee MD        Or    acetaminophen (TYLENOL) suppository 650 mg  650 mg Rectal Q6H PRN Braulio Bee MD        polyethylene glycol [facility]) packet 17 g  17 g Oral Daily PRN Braulio Bee MD        promethazine (PHENERGAN) tablet 12.5 mg  12.5 mg Oral Q6H PRN Braulio Bee MD        Or    ondansetron [facility]) injection 4 mg  4 mg Intravenous Q6H PRN Braulio Bee MD        enoxaparin (LOVENOX) injection 40 mg  40 mg Subcutaneous Daily Braulio Bee MD   40 mg at 12/05/20 0932    insulin lispro (HUMALOG) injection vial 0-6 Units  0-6 Units Subcutaneous TID WC Braulio Bee MD   1 Units at 12/03/20 1708    insulin lispro (HUMALOG) injection vial 0-3 Units  0-3 Units Subcutaneous Nightly Braulio Bee MD   1 Units at 12/04/20 2113       Subjective:     S/p debridement POD #1  No new complaints except for postop pain  No acute overnight events    Objective:        Intake/Output Summary (Last 24 hours) at 12/5/2020 1329  Last data filed at 12/5/2020 0936  Gross per 24 hour   Intake 960 ml   Output 2420 ml   Net -1460 ml      Vitals:   Vitals:    12/05/20 0711   BP: (!) 161/78   Pulse: 62   Resp: 16   Temp: 98.2 °F (36.8 °C)   SpO2: 94%     Physical Exam:  General Appearance:    Alert, cooperative, no distress  Head:      Normocephalic, without obvious abnormality, atraumatic  Eyes:       Conjunctiva/corneas clear, EOM's intact  Lungs:    Clear to auscultation bilaterally, respirations unlabored  Heart:                Regular rate and rhythm, S1 and S2 normal, no murmur,   rub or gallop  Abdomen:     Soft, non-tender, bowel sounds active, no masses, no organomegaly  Extremities:   Left foot postop changes +  Neurological:   Grossly Intact. Significant Diagnostic Studies:   DATA:    CBC   Recent Labs     12/03/20  1730 12/04/20  0600   WBC 8.1 7.2   HGB 11.7* 11.4*   HCT 37.6* 36.4*    245      BMP   Recent Labs     12/03/20  1730 12/04/20  0600    142   K 4.3 3.7    109   CO2 28 25   BUN 18 14   CREATININE 1.0 0.9     LFT'S   Recent Labs     12/03/20  1730   AST 15   ALT 17   BILIDIR 0.2   BILITOT 0.4   ALKPHOS 47     COAG No results for input(s): INR in the last 72 hours. POC:   Lab Results   Component Value Date    POCGLU 138 12/05/2020    POCGLU 187 12/04/2020    POCGLU 109 12/04/2020    POCGLU 140 12/04/2020     [id number]:  Lab Results   Component Value Date    LABA1C 7.4 12/03/2020     CARDIAC ENZYMES  No results for input(s): CKTOTAL, CKMB, CKMBINDEX, TROPONINI in the last 72 hours. Troponin: No results for input(s): TROPONINT in the last 72 hours. BNP: No results for input(s): PROBNP in the last 72 hours. U/A:  No results found for: NITRITE, COLORU, WBCUA, RBCUA, MUCUS, BACTERIA, CLARITYU, SPECGRAV, LEUKOCYTESUR, BLOODU, GLUCOSEU, AMORPHOUS    No results found.         Lila Manus  Rounding Hospitalist

## 2020-12-06 LAB
ANION GAP SERPL CALCULATED.3IONS-SCNC: 8 MMOL/L (ref 4–16)
BUN BLDV-MCNC: 8 MG/DL (ref 6–23)
CALCIUM SERPL-MCNC: 9.9 MG/DL (ref 8.3–10.6)
CHLORIDE BLD-SCNC: 110 MMOL/L (ref 99–110)
CO2: 22 MMOL/L (ref 21–32)
CREAT SERPL-MCNC: 0.9 MG/DL (ref 0.9–1.3)
GFR AFRICAN AMERICAN: >60 ML/MIN/1.73M2
GFR NON-AFRICAN AMERICAN: >60 ML/MIN/1.73M2
GLUCOSE BLD-MCNC: 131 MG/DL (ref 70–99)
GLUCOSE BLD-MCNC: 163 MG/DL (ref 70–99)
GLUCOSE BLD-MCNC: 176 MG/DL (ref 70–99)
GLUCOSE BLD-MCNC: 181 MG/DL (ref 70–99)
GLUCOSE BLD-MCNC: 188 MG/DL (ref 70–99)
HCT VFR BLD CALC: 38.1 % (ref 42–52)
HEMOGLOBIN: 12.1 GM/DL (ref 13.5–18)
MCH RBC QN AUTO: 26.4 PG (ref 27–31)
MCHC RBC AUTO-ENTMCNC: 31.8 % (ref 32–36)
MCV RBC AUTO: 83 FL (ref 78–100)
PDW BLD-RTO: 13.3 % (ref 11.7–14.9)
PLATELET # BLD: 275 K/CU MM (ref 140–440)
PMV BLD AUTO: 10.3 FL (ref 7.5–11.1)
POTASSIUM SERPL-SCNC: 3.9 MMOL/L (ref 3.5–5.1)
RBC # BLD: 4.59 M/CU MM (ref 4.6–6.2)
SODIUM BLD-SCNC: 140 MMOL/L (ref 135–145)
WBC # BLD: 8.1 K/CU MM (ref 4–10.5)

## 2020-12-06 PROCEDURE — 80048 BASIC METABOLIC PNL TOTAL CA: CPT

## 2020-12-06 PROCEDURE — 82962 GLUCOSE BLOOD TEST: CPT

## 2020-12-06 PROCEDURE — 1200000000 HC SEMI PRIVATE

## 2020-12-06 PROCEDURE — 85027 COMPLETE CBC AUTOMATED: CPT

## 2020-12-06 PROCEDURE — 6370000000 HC RX 637 (ALT 250 FOR IP): Performed by: INTERNAL MEDICINE

## 2020-12-06 PROCEDURE — 36415 COLL VENOUS BLD VENIPUNCTURE: CPT

## 2020-12-06 PROCEDURE — 90686 IIV4 VACC NO PRSV 0.5 ML IM: CPT | Performed by: INTERNAL MEDICINE

## 2020-12-06 PROCEDURE — 6360000002 HC RX W HCPCS: Performed by: INTERNAL MEDICINE

## 2020-12-06 PROCEDURE — 2580000003 HC RX 258: Performed by: INTERNAL MEDICINE

## 2020-12-06 RX ADMIN — GLIPIZIDE 10 MG: 10 TABLET ORAL at 07:29

## 2020-12-06 RX ADMIN — PIPERACILLIN AND TAZOBACTAM 3.38 G: 3; .375 INJECTION, POWDER, LYOPHILIZED, FOR SOLUTION INTRAVENOUS at 23:52

## 2020-12-06 RX ADMIN — INSULIN LISPRO 1 UNITS: 100 INJECTION, SOLUTION INTRAVENOUS; SUBCUTANEOUS at 16:32

## 2020-12-06 RX ADMIN — TOPIRAMATE 50 MG: 25 TABLET, FILM COATED ORAL at 21:08

## 2020-12-06 RX ADMIN — TOPIRAMATE 50 MG: 25 TABLET, FILM COATED ORAL at 08:14

## 2020-12-06 RX ADMIN — PIPERACILLIN AND TAZOBACTAM 3.38 G: 3; .375 INJECTION, POWDER, LYOPHILIZED, FOR SOLUTION INTRAVENOUS at 00:07

## 2020-12-06 RX ADMIN — PIPERACILLIN AND TAZOBACTAM 3.38 G: 3; .375 INJECTION, POWDER, LYOPHILIZED, FOR SOLUTION INTRAVENOUS at 12:34

## 2020-12-06 RX ADMIN — GLIPIZIDE 10 MG: 10 TABLET ORAL at 16:32

## 2020-12-06 RX ADMIN — SODIUM CHLORIDE: 9 INJECTION, SOLUTION INTRAVENOUS at 12:14

## 2020-12-06 RX ADMIN — DEXTROSE MONOHYDRATE 750 MG: 50 INJECTION, SOLUTION INTRAVENOUS at 09:08

## 2020-12-06 RX ADMIN — HYDROCODONE BITARTRATE AND ACETAMINOPHEN 1 TABLET: 5; 325 TABLET ORAL at 05:58

## 2020-12-06 RX ADMIN — PIPERACILLIN AND TAZOBACTAM 3.38 G: 3; .375 INJECTION, POWDER, LYOPHILIZED, FOR SOLUTION INTRAVENOUS at 05:54

## 2020-12-06 RX ADMIN — ASPIRIN 81 MG 81 MG: 81 TABLET ORAL at 08:14

## 2020-12-06 RX ADMIN — INSULIN LISPRO 1 UNITS: 100 INJECTION, SOLUTION INTRAVENOUS; SUBCUTANEOUS at 12:41

## 2020-12-06 RX ADMIN — ENOXAPARIN SODIUM 40 MG: 40 INJECTION SUBCUTANEOUS at 08:14

## 2020-12-06 RX ADMIN — INFLUENZA A VIRUS A/VICTORIA/2454/2019 IVR-207 (H1N1) ANTIGEN (PROPIOLACTONE INACTIVATED), INFLUENZA A VIRUS A/HONG KONG/2671/2019 IVR-208 (H3N2) ANTIGEN (PROPIOLACTONE INACTIVATED), INFLUENZA B VIRUS B/VICTORIA/705/2018 BVR-11 ANTIGEN (PROPIOLACTONE INACTIVATED), INFLUENZA B VIRUS B/PHUKET/3073/2013 BVR-1B ANTIGEN (PROPIOLACTONE INACTIVATED) 0.5 ML: 15; 15; 15; 15 INJECTION, SUSPENSION INTRAMUSCULAR at 12:39

## 2020-12-06 RX ADMIN — PIPERACILLIN AND TAZOBACTAM 3.38 G: 3; .375 INJECTION, POWDER, LYOPHILIZED, FOR SOLUTION INTRAVENOUS at 18:09

## 2020-12-06 RX ADMIN — INSULIN LISPRO 1 UNITS: 100 INJECTION, SOLUTION INTRAVENOUS; SUBCUTANEOUS at 21:08

## 2020-12-06 RX ADMIN — LISINOPRIL 40 MG: 40 TABLET ORAL at 08:14

## 2020-12-06 RX ADMIN — ATORVASTATIN CALCIUM 40 MG: 40 TABLET, FILM COATED ORAL at 08:14

## 2020-12-06 RX ADMIN — DEXTROSE MONOHYDRATE 1000 MG: 50 INJECTION, SOLUTION INTRAVENOUS at 22:32

## 2020-12-06 RX ADMIN — DEXTROSE MONOHYDRATE 1000 MG: 50 INJECTION, SOLUTION INTRAVENOUS at 10:37

## 2020-12-06 RX ADMIN — DEXTROSE MONOHYDRATE 750 MG: 50 INJECTION, SOLUTION INTRAVENOUS at 21:08

## 2020-12-06 RX ADMIN — AMLODIPINE BESYLATE 10 MG: 10 TABLET ORAL at 08:14

## 2020-12-06 ASSESSMENT — PAIN - FUNCTIONAL ASSESSMENT: PAIN_FUNCTIONAL_ASSESSMENT: ACTIVITIES ARE NOT PREVENTED

## 2020-12-06 ASSESSMENT — PAIN SCALES - GENERAL
PAINLEVEL_OUTOF10: 0
PAINLEVEL_OUTOF10: 0
PAINLEVEL_OUTOF10: 5

## 2020-12-06 ASSESSMENT — PAIN DESCRIPTION - FREQUENCY: FREQUENCY: INTERMITTENT

## 2020-12-06 NOTE — PROGRESS NOTES
lisinopril (PRINIVIL;ZESTRIL) tablet 40 mg  40 mg Oral Daily Brady Frey MD   40 mg at 12/06/20 0814    topiramate (TOPAMAX) tablet 50 mg  50 mg Oral BID Brady Frey MD   50 mg at 12/06/20 0814    glucose (GLUTOSE) 40 % oral gel 15 g  15 g Oral PRN Brady Frey MD        dextrose 50 % IV solution  12.5 g Intravenous PRN Brady Frey MD        glucagon (rDNA) injection 1 mg  1 mg Intramuscular PRN Brady Frey MD        dextrose 5 % solution  100 mL/hr Intravenous PRN Brady Frey MD        0.9 % sodium chloride infusion   Intravenous Continuous Brady Frey MD 75 mL/hr at 12/05/20 2213      sodium chloride flush 0.9 % injection 10 mL  10 mL Intravenous 2 times per day Brady Frey MD        sodium chloride flush 0.9 % injection 10 mL  10 mL Intravenous PRN Brady Frey MD        potassium chloride 10 mEq/100 mL IVPB (Peripheral Line)  10 mEq Intravenous PRN Brady Frey MD        magnesium sulfate 1 g in dextrose 5% 100 mL IVPB  1 g Intravenous PRN Brady Frey MD        acetaminophen (TYLENOL) tablet 650 mg  650 mg Oral Q6H PRN Brady Frey MD        Or    acetaminophen (TYLENOL) suppository 650 mg  650 mg Rectal Q6H PRN Brady Frey MD        polyethylene glycol [facility]) packet 17 g  17 g Oral Daily PRN Brady Frey MD        promethazine (PHENERGAN) tablet 12.5 mg  12.5 mg Oral Q6H PRN Brady Frey MD        Or    [facility]etron [facility]) injection 4 mg  4 mg Intravenous Q6H PRN Brady Frey MD        enoxaparin (LOVENOX) injection 40 mg  40 mg Subcutaneous Daily Brady Frey MD   40 mg at 12/06/20 0814    insulin lispro (HUMALOG) injection vial 0-6 Units  0-6 Units Subcutaneous TID WC Brady Frey MD   2 Units at 12/05/20 1713    insulin lispro (HUMALOG) injection vial 0-3 Units  0-3 Units Subcutaneous Nightly Brady Frey MD   1 Units at 12/05/20 2047       Subjective:     S/p debridement POD #2  No new complaints except for postop pain  No acute overnight events    Objective: Intake/Output Summary (Last 24 hours) at 12/6/2020 1024  Last data filed at 12/6/2020 0706  Gross per 24 hour   Intake 480 ml   Output 3800 ml   Net -3320 ml      Vitals:   Vitals:    12/06/20 0703   BP: (!) 174/84   Pulse: 56   Resp: 16   Temp: 98.2 °F (36.8 °C)   SpO2: 94%     Physical Exam:  General Appearance:    Alert, cooperative, no distress  Head:      Normocephalic, without obvious abnormality, atraumatic  Eyes:       Conjunctiva/corneas clear, EOM's intact  Lungs:    Clear to auscultation bilaterally, respirations unlabored  Heart:                Regular rate and rhythm, S1 and S2 normal, no murmur,   rub or gallop  Abdomen:     Soft, non-tender, bowel sounds active, no masses, no organomegaly  Extremities:   Left foot postop changes +  Neurological:   Grossly Intact. Significant Diagnostic Studies:   DATA:    CBC   Recent Labs     12/03/20  1730 12/04/20  0600 12/06/20  0830   WBC 8.1 7.2 8.1   HGB 11.7* 11.4* 12.1*   HCT 37.6* 36.4* 38.1*    245 275      BMP   Recent Labs     12/03/20  1730 12/04/20  0600 12/06/20  0830    142 140   K 4.3 3.7 3.9    109 110   CO2 28 25 22   BUN 18 14 8   CREATININE 1.0 0.9 0.9     LFT'S   Recent Labs     12/03/20  1730   AST 15   ALT 17   BILIDIR 0.2   BILITOT 0.4   ALKPHOS 47     COAG No results for input(s): INR in the last 72 hours. POC:   Lab Results   Component Value Date    POCGLU 131 12/06/2020    POCGLU 177 12/05/2020    POCGLU 200 12/05/2020    POCGLU 138 12/05/2020     [id number]:  Lab Results   Component Value Date    LABA1C 7.4 12/03/2020     CARDIAC ENZYMES  No results for input(s): CKTOTAL, CKMB, CKMBINDEX, TROPONINI in the last 72 hours. Troponin: No results for input(s): TROPONINT in the last 72 hours. BNP: No results for input(s): PROBNP in the last 72 hours.   U/A:  No results found for: NITRITE, COLORU, [address], RBCUA, MUCUS, BACTERIA, CLARITYU, [address], LEUKOCYTESUR, BLOODU, GLUCOSEU, AMORPHOUS    No results found.         Lila Roberts  [facility] Hospitalist

## 2020-12-06 NOTE — PLAN OF CARE
Problem: Falls - Risk of:  Goal: Will remain free from falls  Description: Will remain free from falls  12/5/2020 2021 by Kimberly Lao RN  Outcome: Ongoing  12/5/2020 1557 by Kimberly Lao RN  Outcome: Ongoing  12/5/2020 0757 by Berny Vilchis RN  Outcome: Ongoing  Goal: Absence of physical injury  Description: Absence of physical injury  12/5/2020 2021 by Kimberly Lao RN  Outcome: Ongoing  12/5/2020 1557 by Kimberly Lao RN  Outcome: Ongoing  12/5/2020 0757 by Berny Vilchis RN  Outcome: Ongoing

## 2020-12-06 NOTE — PLAN OF CARE
Problem: Falls - Risk of:  Goal: Will remain free from falls  Description: Will remain free from falls  12/6/2020 0734 by Alva Gay RN  Outcome: Ongoing  12/5/2020 2021 by Alva Gay RN  Outcome: Ongoing  Goal: Absence of physical injury  Description: Absence of physical injury  12/6/2020 0734 by Alva Gay RN  Outcome: Ongoing  12/5/2020 2021 by Alva Gay RN  Outcome: Ongoing

## 2020-12-07 ENCOUNTER — APPOINTMENT (OUTPATIENT)
Dept: GENERAL RADIOLOGY | Age: 55
DRG: 629 | End: 2020-12-07
Attending: INTERNAL MEDICINE
Payer: COMMERCIAL

## 2020-12-07 VITALS
RESPIRATION RATE: 16 BRPM | HEIGHT: 76 IN | HEART RATE: 73 BPM | SYSTOLIC BLOOD PRESSURE: 173 MMHG | DIASTOLIC BLOOD PRESSURE: 84 MMHG | WEIGHT: 240.1 LBS | TEMPERATURE: 98.9 F | BODY MASS INDEX: 29.24 KG/M2 | OXYGEN SATURATION: 97 %

## 2020-12-07 LAB
GLUCOSE BLD-MCNC: 138 MG/DL (ref 70–99)
GLUCOSE BLD-MCNC: 161 MG/DL (ref 70–99)
GLUCOSE BLD-MCNC: 205 MG/DL (ref 70–99)

## 2020-12-07 PROCEDURE — 02HV33Z INSERTION OF INFUSION DEVICE INTO SUPERIOR VENA CAVA, PERCUTANEOUS APPROACH: ICD-10-PCS | Performed by: RADIOLOGY

## 2020-12-07 PROCEDURE — 76937 US GUIDE VASCULAR ACCESS: CPT

## 2020-12-07 PROCEDURE — 6370000000 HC RX 637 (ALT 250 FOR IP): Performed by: INTERNAL MEDICINE

## 2020-12-07 PROCEDURE — 2580000003 HC RX 258: Performed by: INTERNAL MEDICINE

## 2020-12-07 PROCEDURE — 94761 N-INVAS EAR/PLS OXIMETRY MLT: CPT

## 2020-12-07 PROCEDURE — 71045 X-RAY EXAM CHEST 1 VIEW: CPT

## 2020-12-07 PROCEDURE — 82962 GLUCOSE BLOOD TEST: CPT

## 2020-12-07 PROCEDURE — 2500000003 HC RX 250 WO HCPCS: Performed by: INTERNAL MEDICINE

## 2020-12-07 PROCEDURE — 6360000002 HC RX W HCPCS: Performed by: INTERNAL MEDICINE

## 2020-12-07 PROCEDURE — C1751 CATH, INF, PER/CENT/MIDLINE: HCPCS

## 2020-12-07 PROCEDURE — 36569 INSJ PICC 5 YR+ W/O IMAGING: CPT

## 2020-12-07 RX ORDER — HYDROCHLOROTHIAZIDE 25 MG/1
25 TABLET ORAL EVERY MORNING
Qty: 30 TABLET | Refills: 0 | Status: SHIPPED | OUTPATIENT
Start: 2020-12-07

## 2020-12-07 RX ORDER — VANCOMYCIN HYDROCHLORIDE 1 G/20ML
750 INJECTION, POWDER, LYOPHILIZED, FOR SOLUTION INTRAVENOUS ONCE
Status: COMPLETED | OUTPATIENT
Start: 2020-12-07 | End: 2020-12-07

## 2020-12-07 RX ORDER — DEXTROSE MONOHYDRATE 50 MG/ML
INJECTION, SOLUTION INTRAVENOUS ONCE
Status: DISCONTINUED | OUTPATIENT
Start: 2020-12-07 | End: 2020-12-07 | Stop reason: HOSPADM

## 2020-12-07 RX ORDER — SODIUM CHLORIDE 0.9 % (FLUSH) 0.9 %
10 SYRINGE (ML) INJECTION PRN
Status: DISCONTINUED | OUTPATIENT
Start: 2020-12-07 | End: 2020-12-07 | Stop reason: HOSPADM

## 2020-12-07 RX ORDER — LIDOCAINE HYDROCHLORIDE 10 MG/ML
5 INJECTION, SOLUTION EPIDURAL; INFILTRATION; INTRACAUDAL; PERINEURAL ONCE
Status: COMPLETED | OUTPATIENT
Start: 2020-12-07 | End: 2020-12-07

## 2020-12-07 RX ORDER — SODIUM CHLORIDE 0.9 % (FLUSH) 0.9 %
10 SYRINGE (ML) INJECTION EVERY 12 HOURS SCHEDULED
Status: DISCONTINUED | OUTPATIENT
Start: 2020-12-07 | End: 2020-12-07 | Stop reason: HOSPADM

## 2020-12-07 RX ADMIN — ENOXAPARIN SODIUM 40 MG: 40 INJECTION SUBCUTANEOUS at 08:18

## 2020-12-07 RX ADMIN — SODIUM CHLORIDE, PRESERVATIVE FREE 10 ML: 5 INJECTION INTRAVENOUS at 09:40

## 2020-12-07 RX ADMIN — PIPERACILLIN AND TAZOBACTAM 3.38 G: 3; .375 INJECTION, POWDER, LYOPHILIZED, FOR SOLUTION INTRAVENOUS at 06:00

## 2020-12-07 RX ADMIN — LIDOCAINE HYDROCHLORIDE ANHYDROUS 5 ML: 10 INJECTION, SOLUTION INFILTRATION at 09:31

## 2020-12-07 RX ADMIN — VANCOMYCIN HYDROCHLORIDE 1750 MG: 750 INJECTION, POWDER, LYOPHILIZED, FOR SOLUTION INTRAVENOUS at 09:37

## 2020-12-07 RX ADMIN — PIPERACILLIN AND TAZOBACTAM 3.38 G: 3; .375 INJECTION, POWDER, LYOPHILIZED, FOR SOLUTION INTRAVENOUS at 12:43

## 2020-12-07 RX ADMIN — SODIUM CHLORIDE: 9 INJECTION, SOLUTION INTRAVENOUS at 05:59

## 2020-12-07 RX ADMIN — GLIPIZIDE 10 MG: 10 TABLET ORAL at 16:45

## 2020-12-07 RX ADMIN — ASPIRIN 81 MG 81 MG: 81 TABLET ORAL at 08:17

## 2020-12-07 RX ADMIN — AMLODIPINE BESYLATE 10 MG: 10 TABLET ORAL at 08:15

## 2020-12-07 RX ADMIN — SODIUM CHLORIDE, PRESERVATIVE FREE 10 ML: 5 INJECTION INTRAVENOUS at 19:29

## 2020-12-07 RX ADMIN — INSULIN LISPRO 1 UNITS: 100 INJECTION, SOLUTION INTRAVENOUS; SUBCUTANEOUS at 12:44

## 2020-12-07 RX ADMIN — ATORVASTATIN CALCIUM 40 MG: 40 TABLET, FILM COATED ORAL at 08:16

## 2020-12-07 RX ADMIN — VANCOMYCIN HYDROCHLORIDE 1750 MG: 1 INJECTION, POWDER, LYOPHILIZED, FOR SOLUTION INTRAVENOUS at 16:25

## 2020-12-07 RX ADMIN — TOPIRAMATE 50 MG: 25 TABLET, FILM COATED ORAL at 08:15

## 2020-12-07 RX ADMIN — GLIPIZIDE 10 MG: 10 TABLET ORAL at 08:15

## 2020-12-07 RX ADMIN — INSULIN LISPRO 2 UNITS: 100 INJECTION, SOLUTION INTRAVENOUS; SUBCUTANEOUS at 16:45

## 2020-12-07 RX ADMIN — LISINOPRIL 40 MG: 40 TABLET ORAL at 08:16

## 2020-12-07 ASSESSMENT — PAIN SCALES - GENERAL
PAINLEVEL_OUTOF10: 0
PAINLEVEL_OUTOF10: 0

## 2020-12-07 NOTE — PLAN OF CARE
Problem: Falls - Risk of:  Goal: Will remain free from falls  Outcome: Ongoing  Goal: Absence of physical injury  Outcome: Ongoing     Problem: Pain:  Goal: Pain level will decrease  Outcome: Ongoing  Goal: Control of acute pain  Outcome: Ongoing  Goal: Control of chronic pain  Outcome: Ongoing     Problem: Discharge Planning:  Goal: Discharged to appropriate level of care  Outcome: Ongoing     Problem: Serum Glucose Level - Abnormal:  Goal: Ability to maintain appropriate glucose levels will improve  Outcome: Ongoing     Problem: Sensory Perception - Impaired:  Goal: Ability to maintain a stable neurologic state will improve  Outcome: Ongoing

## 2020-12-07 NOTE — DISCHARGE SUMMARY
Red Michaels 1965 [id number]  PCP:  Garry Aase    Admit date: 12/3/2020  Admitting Physician: Jesusita Gay MD    Discharge date: 12/7/2020 Discharge Physician: Jesusita Gay MD         Discharge Diagnoses. As per below    Hospital Course:  History of present illness at admission: As per H&P  Subsequent Hospital Course:     -Left foot/toe osteomyelitis discontinue broad-spectrum ABX. Cultures growing staph aureus.   S/p debridement postop day 3 . Pain control, weightbearing activity per Podiatry. Discharge home on vancomycin x28 days as per Dr. Jessica Ramsay upon DC.  -Diabetes mellitus continue home diabetic regimen upon DC. HbA1c 7.4. Treated with insulin regimen in the hospital while off oral agents. BS stable  -HTN continue Norvasc and lisinopril. Stable vitals  -Hyperlipidemia continue statin.  Stable  -History CVA continue aspirin, statin and good BP control.  Supportive care     On the day of discharge, pt felt better. No new complaints.     Pertinent Exam Findings on Day of Discharge:  General Appearance:    Alert, cooperative, no distress, appears stated age  Head:    Normocephalic, without obvious abnormality, atraumatic  Eyes:    PERRL, conjunctiva/corneas clear, EOM's intact  Lungs:    Clear to auscultation bilaterally, respirations unlabored   Heart:    Regular rate and rhythm, S1 and S2 normal, no murmur,   rub or gallop  Abdomen:     Soft, non-tender, bowel sounds active, no masses, no organomegaly  Extremities:   Extremities normal, atraumatic, no cyanosis or edema    Consults:  IP CONSULT TO PHARMACY  IP CONSULT TO HOME CARE NEEDS    Patient Instructions:   Meek Larkin, [address] Medication Instructions OYF:[id number]    Printed on:12/07/20 1033   Medication Information                      acetaminophen (TYLENOL) 500 MG tablet  Take 500 mg by mouth every 6 hours as needed for Pain (headache)             amLODIPine (NORVASC) 10 MG tablet  Take 10 mg by mouth daily             aspirin 81 MG chewable tablet  Take 81 mg by mouth daily             atorvastatin (LIPITOR) 10 MG tablet  Take 40 mg by mouth daily             glipiZIDE (GLUCOTROL) 10 MG tablet  Take 10 mg by mouth 2 times daily (before meals)             HYDROcodone-acetaminophen (NORCO) 5-325 MG per tablet  Take 1 tablet by mouth every 8 hours as needed for Pain. lisinopril (PRINIVIL;ZESTRIL) 40 MG tablet  Take 40 mg by mouth daily             metFORMIN (GLUCOPHAGE) 1000 MG tablet  Take 1,000 mg by mouth 2 times daily              SITagliptin (JANUVIA) 50 MG tablet  Take 50 mg by mouth daily             topiramate (TOPAMAX) 50 MG tablet  Take 50 mg by mouth 2 times daily             vancomycin (VANCOCIN) infusion  Infuse 1,750 mg intravenously every 12 hours Compound per protocol. Diet:  DIET CARB CONTROL;     Activity:   activity as tolerated     Discharge Condition:   good    Disposition:   home    Follow-up    Santiago Ford  Schedule an appointment as soon as possible for a visit  Medical Management  [address] [place] [address] 2296   Maday Subramanian DPM  Go to  For wound re-check  [address] [facility]  [phone]       Time spent on discharge in the examination, evaluation, counseling and review of medications and discharge plan: 34 minutes       Discharge Physician Signed: Fernando Davalos

## 2020-12-07 NOTE — CONSULTS
PHARMACY VANCOMYCIN MONITORING SERVICE    Rigo Lan is a 63 y.o. male started on vancomycin for foot infection and history of osteomyelitis. Pharmacy consulted by Dr. Shirin Banegas for monitoring and adjustment. Indication for treatment: History of foot infection and osteomyelitis  Goal trough: 15 mcg/mL  Other antimicrobials: Piperacillin-Tazobactam    Ht Readings from Last 1 Encounters:   12/03/20 6' 4\" (1.93 m)     Wt Readings from Last 3 Encounters:   12/03/20 240 lb 1.6 oz (108.9 kg)   11/23/20 255 lb (115.7 kg)   09/30/20 253 lb (114.8 kg)        Pertinent Laboratory Values:   Temp Readings from Last 3 Encounters:   12/07/20 98.9 °F (37.2 °C)   12/04/20 97.7 °F (36.5 °C)   11/23/20 98.6 °F (37 °C) (Temporal)     Recent Labs     12/06/20  0830   WBC 8.1     Recent Labs     12/06/20  0830   BUN 8   CREATININE 0.9     Estimated Creatinine Clearance: 124 mL/min (based on SCr of 0.9 mg/dL). Intake/Output Summary (Last 24 hours) at 12/7/2020 0753  Last data filed at 12/7/2020 0601  Gross per 24 hour   Intake 1691 ml   Output 1600 ml   Net 91 ml       Pertinent Cultures:  Date    Source    Results  12/3/20  Blood    Collected    Previous vancomycin levels:  TROUGH:    Recent Labs     12/05/20  1000   VANCOTROUGH 9.9*     RANDOM:  No results for input(s): VANCORANDOM in the last 72 hours. Assessment/Plan:  · Vancomycin loading dose of 2,000mg will be given followed by a regimen of vancomycin 1,500mg q12h. · On 12/6 the dose was increased to 1750mg Q12H. · Pharmacy will continue to monitor patient and adjust therapy as indicated    VANCOMYCIN [address] [facility]  [address] 12/8/2020 @ 7813    Thank you for the consult.   [address] [place] [address]  12/7/2020 7:53 AM

## 2020-12-07 NOTE — PLAN OF CARE
Problem: Falls - Risk of:  Goal: Will remain free from falls  12/7/2020 1856 by Brian Bear RN  Outcome: Completed  12/7/2020 0842 by Brian Bear RN  Outcome: Ongoing  Goal: Absence of physical injury  12/7/2020 1856 by Brian Bear RN  Outcome: Completed  12/7/2020 0842 by Brian Bear RN  Outcome: Ongoing     Problem: Pain:  Goal: Pain level will decrease  12/7/2020 1856 by Brian Bear RN  Outcome: Completed  12/7/2020 0842 by Brian Bear RN  Outcome: Ongoing  Goal: Control of acute pain  12/7/2020 1856 by Brian Bear RN  Outcome: Completed  12/7/2020 0842 by Brian Bear RN  Outcome: Ongoing  Goal: Control of chronic pain  12/7/2020 1856 by Brian Bear RN  Outcome: Completed  12/7/2020 0842 by Brian Bear RN  Outcome: Ongoing     Problem: Discharge Planning:  Goal: Discharged to appropriate level of care  12/7/2020 1856 by Brian Bear RN  Outcome: Completed  12/7/2020 0842 by Brian Bear RN  Outcome: Ongoing     Problem: Serum Glucose Level - Abnormal:  Goal: Ability to maintain appropriate glucose levels will improve  12/7/2020 1856 by Brian Bear RN  Outcome: Completed  12/7/2020 0842 by Brian Bear RN  Outcome: Ongoing     Problem: Sensory Perception - Impaired:  Goal: Ability to maintain a stable neurologic state will improve  12/7/2020 1856 by Brian Bear RN  Outcome: Completed  12/7/2020 0842 by Brian Bear RN  Outcome: Ongoing

## 2020-12-07 NOTE — CARE COORDINATION
CM faxed the [address] order and supporting documentation to [address] (phone: [phone], fax: [phone]) to initiate the order for [address].      11:17 AM  CM received a return call from Jose Nino with [facility] stating they do not have the staffing available t meet the patient's needs. 11:20 AM  CM spoke with [facility] and [address] [place] [address] Omid Carrero who stated they only provide hospice services to [place]. 11:30 AM  CM called [facility] and left a voicemail requesting return call regarding possible referral.     11:45 AM  CM spoke with [facility] ([phone]) regarding possible consult. [facility] is unsure if they can accommodate the patient's needs but will verify and follow-up with this CM. 12:15 PM  CM spoke with [facility] regarding referral and was asked to fax the patient's facesheet to [phone] so they could verify if they are in-network with the patient's specific [employer] plan. 12:20 PM  CM faxed the patient's facesheet to [facility] as requested for insurance verification. 12:45 PM  CM received return call from anthony with [facility] requesting [address] order and supporting documentation be faxed to [phone] for review. CM will follow. 2 PM  CM received a voicemail from [facility] stating they do not have the staffing available to meet the patient's needs. 2:10 PM  CM received a voicemail from Cydney Jefferson with [facility] stating they can accept the patient. Cydney Jefferson stated that she needs the PICC line insertion report and wound care order orders faxed to her. CM noted that there were no wound care order order, this CM notified Karley Gandhi RN. This CM faxed the PICC note from the PICC nurse. 2:20 PM  CM notified the patient that [facility] has agreed to provide [address].   CM advised the patient that wound care

## 2020-12-07 NOTE — CONSULTS
Education regarding the insertion of a PICC was reviewed with the Client. The risks, benefits, alternatives were discussed and their understanding is verbalized. Consent is given by Client. A procedural time-out is performed at 0915. The PICC line is inserted in the right upper arm without difficulty and per protocol. The client tolerated the procedure well. There is adequate blood return visualized and the lumen(s) flush easily. The education booklet is left at bedside.

## 2020-12-08 NOTE — PROGRESS NOTES
Patient sent home with all belongings including home meds. All personal items verified with patient. Patient had no questions or concerns stating Meek Atkinson RN answered all his questions. Patient taken by [facility] to personal vehicle driven by his wife. PICC line dry, clean, and intact for [facility] use for continued ATB. Patient aware of upcoming f/u appt with Dr. Nate Cortes and of medication changes.

## 2020-12-10 ENCOUNTER — HOSPITAL ENCOUNTER (OUTPATIENT)
Age: 55
Setting detail: SPECIMEN
Discharge: HOME | End: 2020-12-10
Payer: COMMERCIAL

## 2020-12-10 LAB
ANION GAP SERPL CALCULATED.3IONS-SCNC: 12 MMOL/L (ref 4–16)
BUN BLDV-MCNC: 12 MG/DL (ref 6–23)
CALCIUM SERPL-MCNC: 10.1 MG/DL (ref 8.3–10.6)
CHLORIDE BLD-SCNC: 100 MMOL/L (ref 99–110)
CO2: 25 MMOL/L (ref 21–32)
CREAT SERPL-MCNC: 1 MG/DL (ref 0.9–1.3)
CULTURE: ABNORMAL
CULTURE: ABNORMAL
DOSE AMOUNT: NORMAL
DOSE TIME: NORMAL
GFR AFRICAN AMERICAN: >60 ML/MIN/1.73M2
GFR NON-AFRICAN AMERICAN: >60 ML/MIN/1.73M2
GLUCOSE BLD-MCNC: 255 MG/DL (ref 70–99)
Lab: ABNORMAL
POTASSIUM SERPL-SCNC: 4.2 MMOL/L (ref 3.5–5.1)
SODIUM BLD-SCNC: 137 MMOL/L (ref 135–145)
SPECIMEN: ABNORMAL
VANCOMYCIN TROUGH: 12.9 UG/ML (ref 10–20)

## 2020-12-10 PROCEDURE — 80202 ASSAY OF VANCOMYCIN: CPT

## 2020-12-10 PROCEDURE — 80048 BASIC METABOLIC PNL TOTAL CA: CPT

## 2020-12-21 LAB
CULTURE: ABNORMAL
CULTURE: ABNORMAL
Lab: ABNORMAL
SPECIMEN: ABNORMAL

## (undated) DEVICE — LINER SUCT CANSTR 1500CC SEMI RIG W/ POR HYDROPHOBIC SHUT

## (undated) DEVICE — TUBING, SUCTION, 1/4" X 10', STRAIGHT: Brand: MEDLINE

## (undated) DEVICE — SUTURE VCRL SZ 3-0 L27IN ABSRB UD L24MM FS-1 3/8 CIR REV J442H

## (undated) DEVICE — MARKER SURG SKIN UTIL REGULAR/FINE 2 TIP W/ RUL AND 9 LBL

## (undated) DEVICE — GOWN,SIRUS,FABRNF,RAGLAN,L,ST,30/CS: Brand: MEDLINE

## (undated) DEVICE — DRAPE,EXTREMITY,89X128,STERILE: Brand: MEDLINE

## (undated) DEVICE — NEEDLE HYPO 25GA L1.5IN BLU POLYPR HUB S STL REG BVL STR

## (undated) DEVICE — SUTURE ETHLN SZ 4-0 L18IN NONABSORBABLE BLK L19MM PS-2 3/8 1667H

## (undated) DEVICE — SYRINGE MED 10ML LUERLOCK TIP W/O SFTY DISP

## (undated) DEVICE — ELECTRODE ES AD CRDLSS PT RET REM POLYHESIVE

## (undated) DEVICE — CURITY NON-ADHERENT STRIPS: Brand: CURITY

## (undated) DEVICE — AMD ANTIMICROBIAL SUPER SPONGES,MEDIUM: Brand: KERLIX

## (undated) DEVICE — ZIMMER® STERILE DISPOSABLE TOURNIQUET CUFF WITH PLC, DUAL PORT, SINGLE BLADDER, 18 IN. (46 CM)

## (undated) DEVICE — ELECTRODE,RADIOTRANSLUCENT,FOAM,5PK: Brand: MEDLINE

## (undated) DEVICE — CHLORAPREP 26ML ORANGE

## (undated) DEVICE — BANDAGE,ELASTIC,ESMARK,STERILE,4"X9',LF: Brand: MEDLINE

## (undated) DEVICE — BANDAGE,GAUZE,BULKEE II,4.5"X4.1YD,STRL: Brand: MEDLINE

## (undated) DEVICE — TOWEL,OR,DSP,ST,BLUE,STD,6/PK,12PK/CS: Brand: MEDLINE

## (undated) DEVICE — GLOVE ORANGE PI 7   MSG9070

## (undated) DEVICE — SPONGE GZ W4XL8IN COT WVN 12 PLY

## (undated) DEVICE — COUNTER NDL 40 COUNT HLD 70 FOAM BLK ADH W/ MAG

## (undated) DEVICE — GLOVE SURG SZ 6 THK91MIL LTX FREE SYN POLYISOPRENE ANTI

## (undated) DEVICE — SUTURE VCRL SZ 3-0 L27IN ABSRB UD L26MM SH 1/2 CIR J416H

## (undated) DEVICE — PENCIL,CAUTERY,ROCKER,PTFE,15'CORD: Brand: MEDLINE INDUSTRIES, INC.

## (undated) DEVICE — SYRINGE IRRIG 60ML SFT PLIABLE BLB EZ TO GRP 1 HND USE W/

## (undated) DEVICE — PACK,BASIC,IX: Brand: MEDLINE

## (undated) DEVICE — YANKAUER,FLEXIBLE HANDLE,REGLR CAPACITY: Brand: MEDLINE INDUSTRIES, INC.

## (undated) DEVICE — BANDAGE COMPR W4INXL5YD WHT BGE POLY COT M E WRP WV HK AND

## (undated) DEVICE — GAUZE,SPONGE,4"X4",16PLY,XRAY,STRL,LF: Brand: MEDLINE

## (undated) DEVICE — BANDAGE ACE ELASTIC ECON  4.0INX4.5YD

## (undated) DEVICE — PENROSE TUBING RADIOPAQUE: Brand: ARGYLE

## (undated) DEVICE — BLADE SURG NO15 C STL SHRP PREM

## (undated) DEVICE — ANESTHESIA CIRCUIT ADULT-LF: Brand: MEDLINE INDUSTRIES, INC.

## (undated) DEVICE — DRAPE,U/ SHT,SPLIT,PLAS,STERIL: Brand: MEDLINE

## (undated) DEVICE — COUNTER NDL 30 COUNT FOAM STRP SGL MAG

## (undated) DEVICE — TRAY PREP DRY W/ PREM GLV 2 APPL 6 SPNG 2 UNDPD 1 OVERWRAP

## (undated) DEVICE — SUTURE VCRL SZ 4-0 L18IN ABSRB UD L19MM PS-2 3/8 CIR PRIM J496H

## (undated) DEVICE — SUTURE ETHLN SZ 3-0 L30IN NONABSORBABLE BLK FS-1 L24MM 3/8 669H

## (undated) DEVICE — SUTURE VCRL SZ 4-0 L27IN ABSRB UD L17MM RB-1 1/2 CIR J214H

## (undated) DEVICE — SUTURE NONABSORBABLE MONOFILAMENT 4-0 PS-2 18 IN BLK ETHILON 1667G

## (undated) DEVICE — INTENDED FOR TISSUE SEPARATION, AND OTHER PROCEDURES THAT REQUIRE A SHARP SURGICAL BLADE TO PUNCTURE OR CUT.: Brand: BARD-PARKER ® STAINLESS STEEL BLADES

## (undated) DEVICE — SUTURE COAT VCRL SZ 4-0 L18IN ABSRB UD L19MM PS-2 1/2 CIR J496G